# Patient Record
Sex: FEMALE | Race: WHITE | Employment: FULL TIME | URBAN - METROPOLITAN AREA
[De-identification: names, ages, dates, MRNs, and addresses within clinical notes are randomized per-mention and may not be internally consistent; named-entity substitution may affect disease eponyms.]

---

## 2017-02-08 ENCOUNTER — OFFICE VISIT (OUTPATIENT)
Dept: FAMILY MEDICINE CLINIC | Age: 32
End: 2017-02-08

## 2017-02-08 VITALS
BODY MASS INDEX: 23.76 KG/M2 | DIASTOLIC BLOOD PRESSURE: 70 MMHG | SYSTOLIC BLOOD PRESSURE: 100 MMHG | OXYGEN SATURATION: 99 % | HEART RATE: 67 BPM | HEIGHT: 69 IN | TEMPERATURE: 97.8 F | WEIGHT: 160.4 LBS

## 2017-02-08 DIAGNOSIS — J01.40 ACUTE PANSINUSITIS, RECURRENCE NOT SPECIFIED: Primary | ICD-10-CM

## 2017-02-08 DIAGNOSIS — R05.9 COUGH: ICD-10-CM

## 2017-02-08 PROCEDURE — 99202 OFFICE O/P NEW SF 15 MIN: CPT | Performed by: FAMILY MEDICINE

## 2017-02-08 RX ORDER — BENZONATATE 200 MG/1
200 CAPSULE ORAL 3 TIMES DAILY PRN
Qty: 30 CAPSULE | Refills: 0 | Status: SHIPPED | OUTPATIENT
Start: 2017-02-08 | End: 2017-02-15

## 2017-02-08 RX ORDER — PREDNISONE 20 MG/1
20 TABLET ORAL 2 TIMES DAILY
Qty: 10 TABLET | Refills: 0 | Status: SHIPPED | OUTPATIENT
Start: 2017-02-08 | End: 2017-02-13

## 2017-02-08 RX ORDER — CODEINE PHOSPHATE, PHENYLEPHRINE HYDROCHLORIDE, CHLORPHENIRAMINE MALEATE 10; 5; 2 MG/5ML; MG/5ML; MG/5ML
5 LIQUID ORAL PRN
Refills: 0 | COMMUNITY
Start: 2017-02-01 | End: 2017-04-26 | Stop reason: ALTCHOICE

## 2017-02-08 RX ORDER — AZELASTINE 1 MG/ML
1 SPRAY, METERED NASAL 2 TIMES DAILY
Qty: 1 BOTTLE | Refills: 1 | Status: SHIPPED | OUTPATIENT
Start: 2017-02-08 | End: 2018-07-17 | Stop reason: ALTCHOICE

## 2017-02-09 ENCOUNTER — TELEPHONE (OUTPATIENT)
Dept: FAMILY MEDICINE CLINIC | Age: 32
End: 2017-02-09

## 2017-02-10 ENCOUNTER — TELEPHONE (OUTPATIENT)
Dept: FAMILY MEDICINE CLINIC | Age: 32
End: 2017-02-10

## 2017-04-26 ENCOUNTER — OFFICE VISIT (OUTPATIENT)
Dept: FAMILY MEDICINE CLINIC | Age: 32
End: 2017-04-26

## 2017-04-26 VITALS
SYSTOLIC BLOOD PRESSURE: 112 MMHG | HEART RATE: 82 BPM | HEIGHT: 68 IN | WEIGHT: 165.4 LBS | DIASTOLIC BLOOD PRESSURE: 66 MMHG | TEMPERATURE: 97.7 F | OXYGEN SATURATION: 99 % | BODY MASS INDEX: 25.07 KG/M2

## 2017-04-26 DIAGNOSIS — J02.9 PHARYNGITIS, UNSPECIFIED ETIOLOGY: ICD-10-CM

## 2017-04-26 DIAGNOSIS — J01.41 ACUTE RECURRENT PANSINUSITIS: Primary | ICD-10-CM

## 2017-04-26 PROBLEM — J30.2 SEASONAL ALLERGIC RHINITIS: Status: ACTIVE | Noted: 2017-04-26

## 2017-04-26 LAB — S PYO AG THROAT QL: NORMAL

## 2017-04-26 PROCEDURE — 87880 STREP A ASSAY W/OPTIC: CPT | Performed by: FAMILY MEDICINE

## 2017-04-26 PROCEDURE — 99213 OFFICE O/P EST LOW 20 MIN: CPT | Performed by: FAMILY MEDICINE

## 2017-04-26 RX ORDER — AZITHROMYCIN 250 MG/1
TABLET, FILM COATED ORAL
Qty: 1 PACKET | Refills: 0 | Status: SHIPPED | OUTPATIENT
Start: 2017-04-26 | End: 2018-07-17 | Stop reason: ALTCHOICE

## 2017-04-26 RX ORDER — PREDNISONE 20 MG/1
20 TABLET ORAL 2 TIMES DAILY
Qty: 10 TABLET | Refills: 0 | Status: SHIPPED | OUTPATIENT
Start: 2017-04-26 | End: 2017-05-01

## 2017-04-26 RX ORDER — ACETAMINOPHEN 500 MG
500 TABLET ORAL EVERY 6 HOURS PRN
COMMUNITY

## 2017-04-26 ASSESSMENT — ENCOUNTER SYMPTOMS
SORE THROAT: 1
COUGH: 1
SHORTNESS OF BREATH: 0
VOMITING: 0
EYE DISCHARGE: 0
BLOOD IN STOOL: 0
EYE DISCHARGE: 0
SORE THROAT: 1
DIARRHEA: 0
SINUS COMPLAINT: 1
SHORTNESS OF BREATH: 0
NAUSEA: 0
VOMITING: 0
COUGH: 0
DIARRHEA: 0
NAUSEA: 1
BACK PAIN: 0
SINUS PRESSURE: 0
BACK PAIN: 0
SINUS PRESSURE: 1

## 2018-07-17 ENCOUNTER — OFFICE VISIT (OUTPATIENT)
Dept: FAMILY MEDICINE CLINIC | Age: 33
End: 2018-07-17

## 2018-07-17 VITALS
DIASTOLIC BLOOD PRESSURE: 60 MMHG | HEIGHT: 67 IN | OXYGEN SATURATION: 99 % | SYSTOLIC BLOOD PRESSURE: 106 MMHG | BODY MASS INDEX: 26.09 KG/M2 | HEART RATE: 66 BPM | WEIGHT: 166.2 LBS

## 2018-07-17 DIAGNOSIS — F32.A ANXIETY AND DEPRESSION: Primary | ICD-10-CM

## 2018-07-17 DIAGNOSIS — F41.9 ANXIETY AND DEPRESSION: Primary | ICD-10-CM

## 2018-07-17 PROCEDURE — 99213 OFFICE O/P EST LOW 20 MIN: CPT | Performed by: NURSE PRACTITIONER

## 2018-07-17 RX ORDER — FLUOXETINE HYDROCHLORIDE 20 MG/1
20 CAPSULE ORAL DAILY
Qty: 30 CAPSULE | Refills: 1 | Status: SHIPPED | OUTPATIENT
Start: 2018-07-17 | End: 2018-08-23 | Stop reason: SDUPTHER

## 2018-07-17 RX ORDER — HYDROXYZINE PAMOATE 25 MG/1
25 CAPSULE ORAL 3 TIMES DAILY PRN
Qty: 90 CAPSULE | Refills: 0 | Status: SHIPPED | OUTPATIENT
Start: 2018-07-17 | End: 2018-08-13 | Stop reason: SDUPTHER

## 2018-07-17 NOTE — PROGRESS NOTES
Therapy completed    azithromycin (ZITHROMAX) 250 MG tablet Therapy completed       No orders of the defined types were placed in this encounter. Care discussed with patient. Questions answered. Patient verbalizes understanding and agrees with plan. After visit summary provided. Advised to call for any problems, questions, or concerns. Patient will be contacted in two to three days to check on progress.          No Follow-up on file.                                              Signed:  EDUARDA Bradshaw CNP  07/17/18  2:14 PM

## 2018-07-19 ASSESSMENT — ENCOUNTER SYMPTOMS
VOMITING: 0
RESPIRATORY NEGATIVE: 1
NAUSEA: 0
DIARRHEA: 0

## 2018-08-13 DIAGNOSIS — F41.9 ANXIETY AND DEPRESSION: ICD-10-CM

## 2018-08-13 DIAGNOSIS — F32.A ANXIETY AND DEPRESSION: ICD-10-CM

## 2018-08-14 RX ORDER — HYDROXYZINE PAMOATE 25 MG/1
25 CAPSULE ORAL 3 TIMES DAILY PRN
Qty: 90 CAPSULE | Refills: 0 | Status: SHIPPED | OUTPATIENT
Start: 2018-08-14 | End: 2018-08-23

## 2018-08-23 ENCOUNTER — OFFICE VISIT (OUTPATIENT)
Dept: FAMILY MEDICINE CLINIC | Age: 33
End: 2018-08-23

## 2018-08-23 VITALS
BODY MASS INDEX: 25.02 KG/M2 | HEIGHT: 67 IN | SYSTOLIC BLOOD PRESSURE: 112 MMHG | OXYGEN SATURATION: 99 % | HEART RATE: 78 BPM | WEIGHT: 159.4 LBS | DIASTOLIC BLOOD PRESSURE: 70 MMHG

## 2018-08-23 DIAGNOSIS — F41.9 ANXIETY AND DEPRESSION: ICD-10-CM

## 2018-08-23 DIAGNOSIS — F32.A ANXIETY AND DEPRESSION: ICD-10-CM

## 2018-08-23 DIAGNOSIS — N92.0 MENORRHAGIA WITH REGULAR CYCLE: Primary | ICD-10-CM

## 2018-08-23 PROCEDURE — 99213 OFFICE O/P EST LOW 20 MIN: CPT | Performed by: FAMILY MEDICINE

## 2018-08-23 RX ORDER — DESOGESTREL AND ETHINYL ESTRADIOL 0.15-0.03
1 KIT ORAL DAILY
Qty: 1 PACKET | Refills: 5 | Status: SHIPPED | OUTPATIENT
Start: 2018-08-23 | End: 2019-01-10 | Stop reason: SDUPTHER

## 2018-08-23 RX ORDER — FLUOXETINE HYDROCHLORIDE 20 MG/1
20 CAPSULE ORAL DAILY
Qty: 30 CAPSULE | Refills: 5 | Status: SHIPPED | OUTPATIENT
Start: 2018-08-23 | End: 2019-01-15 | Stop reason: SDUPTHER

## 2018-08-23 ASSESSMENT — PATIENT HEALTH QUESTIONNAIRE - PHQ9
SUM OF ALL RESPONSES TO PHQ QUESTIONS 1-9: 0
1. LITTLE INTEREST OR PLEASURE IN DOING THINGS: 0
2. FEELING DOWN, DEPRESSED OR HOPELESS: 0
SUM OF ALL RESPONSES TO PHQ9 QUESTIONS 1 & 2: 0
SUM OF ALL RESPONSES TO PHQ QUESTIONS 1-9: 0

## 2018-08-23 NOTE — PROGRESS NOTES
Chief Complaint   Patient presents with   [facility].     patient is here to establish care just needs refill prozac is working    Anxiety       [place] NARRATIVE:    Mood problems with anxiety and depression, prozac is working well. Actually great. Also interested in OCP, understands need for barrier method. Has not been on ocp before, is in new relationship. Has heavy periods but regular. [employer] is new SO, he is going through a divorce but is an old friend. 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 Above chief complaint and [place] obtained by this physician provider. Review of Systems   Constitutional: Negative for fever and malaise/fatigue. HENT: Negative for congestion. Eyes: Negative for blurred vision. Respiratory: Negative for cough. Cardiovascular: Negative for chest pain and leg swelling. Gastrointestinal: Negative for abdominal pain. Musculoskeletal: Negative for myalgias. Skin: Negative for rash. Neurological: Negative for headaches. Psychiatric/Behavioral: Negative for depression. The patient is not nervous/anxious. No Known Allergies  Allergy history updated. Outpatient Prescriptions Marked as Taking for the 8/23/18 encounter (Office Visit) with Rima Schroeder MD   Medication Sig Dispense Refill    FLUoxetine (PROZAC) 20 MG capsule Take 1 capsule by mouth daily 30 capsule 5    desogestrel-ethinyl estradiol (DESOGEN) 0.15-30 MG-MCG per tablet Take 1 tablet by mouth daily 1 packet 5       Medication list reviewed and reconciled by this provider.       Tobacco use:     Past Medical History:   Diagnosis Date    Seasonal allergic rhinitis 4/26/2017     Past Surgical History:   Procedure Laterality Date    WISDOM TOOTH EXTRACTION  1999     Family History   Problem Relation Age of Onset    Cancer Maternal Aunt     Kidney Disease Paternal Grandmother     Diabetes Paternal Grandfather     High Blood Pressure Paternal Grandfather     High Cholesterol Paternal Grandfather      Social History     Social History    Marital status:      Spouse name: N/A    Number of children: N/A    Years of education: N/A     Social History Main Topics    Smoking status: Never Smoker    Smokeless tobacco: Never Used    Alcohol use Yes      Comment: occasional - couple a week    Drug use: No    Sexual activity: Not Currently     Other Topics Concern    None     Social History Narrative    None       Nursing note reviewed. Vitals:    08/23/18 1607   BP: 112/70   Site: Left Arm   Position: Sitting   Cuff Size: Small Adult   Pulse: 78   SpO2: 99%   Weight: 159 lb 6.4 oz (72.3 kg)   Height: 5' 7.25\" (1.708 m)       BP Readings from Last 3 Encounters:   08/23/18 112/70   07/17/18 106/60   04/26/17 112/66     Wt Readings from Last 3 Encounters:   08/23/18 159 lb 6.4 oz (72.3 kg)   07/17/18 166 lb 3.2 oz (75.4 kg)   04/26/17 165 lb 6.4 oz (75 kg)     Body mass index is 24.78 kg/m². No results found for this visit on 08/23/18. Physical Exam   Constitutional: She is oriented to person, place, and time. She appears well-developed and well-nourished. No distress. HENT:   Head: Normocephalic. Mouth/Throat: Oropharynx is clear and moist.   Eyes: Pupils are equal, round, and reactive to light. Conjunctivae are normal. Right eye exhibits no discharge. Left eye exhibits no discharge. Neck: Normal range of motion. Cardiovascular: Normal rate, regular rhythm and normal heart sounds. No murmur heard. Pulmonary/Chest: Effort normal and breath sounds normal. No respiratory distress. She has no wheezes. She has no rales. Neurological: She is alert and oriented to person, place, and time. Skin: Skin is warm and dry. She is not diaphoretic. Psychiatric: She has a normal mood and affect. Her behavior is normal.   Nursing note and vitals reviewed. Intake paperwork reviewed in detail and scanned to chart.  Previous [facility] notes reviewed. Controlled Substances Monitoring: The Prescription Monitoring Report for this patient was reviewed today. (empty except for cough med from 2/2017) Nasreen Mendieta MD)                    _________________________________________________  Assessment:     Elvia Mayes was seen today for establish care and anxiety. Diagnoses and all orders for this visit:    Menorrhagia with regular cycle  -     desogestrel-ethinyl estradiol (DESOGEN) 0.15-30 MG-MCG per tablet; Take 1 tablet by mouth daily    Anxiety and depression  -     FLUoxetine (PROZAC) 20 MG capsule; Take 1 capsule by mouth daily        _________________________________________________  Plan:     Call for refills in Feb.  Prozac and Desogen prescribed as above. She will eventually need a Pap smear. She is not planning for a date with Toñito Kylerstarla until his divorce is final in a couple months but is excited for the hope of a new relationship. Return in about 6 months (around 2/23/2019), or if symptoms worsen or fail to improve. Encounter note is signed electronically at date and time of note closure.

## 2018-09-09 ASSESSMENT — ENCOUNTER SYMPTOMS
ABDOMINAL PAIN: 0
COUGH: 0
BLURRED VISION: 0

## 2019-01-10 DIAGNOSIS — N92.0 MENORRHAGIA WITH REGULAR CYCLE: ICD-10-CM

## 2019-01-10 RX ORDER — DESOGESTREL AND ETHINYL ESTRADIOL 0.15-0.03
KIT ORAL
Qty: 28 TABLET | Refills: 4 | Status: SHIPPED | OUTPATIENT
Start: 2019-01-10 | End: 2019-04-03 | Stop reason: SDUPTHER

## 2019-01-15 DIAGNOSIS — F32.A ANXIETY AND DEPRESSION: ICD-10-CM

## 2019-01-15 DIAGNOSIS — F41.9 ANXIETY AND DEPRESSION: ICD-10-CM

## 2019-01-15 RX ORDER — FLUOXETINE HYDROCHLORIDE 20 MG/1
20 CAPSULE ORAL DAILY
Qty: 30 CAPSULE | Refills: 5 | Status: SHIPPED | OUTPATIENT
Start: 2019-01-15 | End: 2019-04-03 | Stop reason: SDUPTHER

## 2019-04-03 ENCOUNTER — OFFICE VISIT (OUTPATIENT)
Dept: FAMILY MEDICINE CLINIC | Age: 34
End: 2019-04-03
Payer: COMMERCIAL

## 2019-04-03 VITALS
BODY MASS INDEX: 25.62 KG/M2 | OXYGEN SATURATION: 98 % | DIASTOLIC BLOOD PRESSURE: 60 MMHG | SYSTOLIC BLOOD PRESSURE: 92 MMHG | WEIGHT: 164.8 LBS | HEART RATE: 81 BPM | TEMPERATURE: 98.2 F

## 2019-04-03 DIAGNOSIS — F32.A ANXIETY AND DEPRESSION: ICD-10-CM

## 2019-04-03 DIAGNOSIS — F41.9 ANXIETY AND DEPRESSION: ICD-10-CM

## 2019-04-03 DIAGNOSIS — R12 HEARTBURN: ICD-10-CM

## 2019-04-03 DIAGNOSIS — R53.83 FATIGUE, UNSPECIFIED TYPE: ICD-10-CM

## 2019-04-03 DIAGNOSIS — N92.0 MENORRHAGIA WITH REGULAR CYCLE: ICD-10-CM

## 2019-04-03 DIAGNOSIS — R05.9 COUGH: Primary | ICD-10-CM

## 2019-04-03 PROCEDURE — 99214 OFFICE O/P EST MOD 30 MIN: CPT | Performed by: FAMILY MEDICINE

## 2019-04-03 RX ORDER — FLUOXETINE HYDROCHLORIDE 20 MG/1
20 CAPSULE ORAL DAILY
Qty: 30 CAPSULE | Refills: 5 | Status: SHIPPED | OUTPATIENT
Start: 2019-04-03 | End: 2019-10-14 | Stop reason: SDUPTHER

## 2019-04-03 RX ORDER — DESOGESTREL AND ETHINYL ESTRADIOL 0.15-0.03
1 KIT ORAL DAILY
Qty: 28 TABLET | Refills: 5 | Status: SHIPPED | OUTPATIENT
Start: 2019-04-03 | End: 2019-09-25 | Stop reason: SDUPTHER

## 2019-04-03 RX ORDER — OMEPRAZOLE 20 MG/1
20 CAPSULE, DELAYED RELEASE ORAL
Qty: 30 CAPSULE | Refills: 0 | Status: SHIPPED | OUTPATIENT
Start: 2019-04-03 | End: 2019-04-26 | Stop reason: SDUPTHER

## 2019-04-03 ASSESSMENT — PATIENT HEALTH QUESTIONNAIRE - PHQ9
SUM OF ALL RESPONSES TO PHQ9 QUESTIONS 1 & 2: 0
1. LITTLE INTEREST OR PLEASURE IN DOING THINGS: 0
SUM OF ALL RESPONSES TO PHQ QUESTIONS 1-9: 0
SUM OF ALL RESPONSES TO PHQ QUESTIONS 1-9: 0
2. FEELING DOWN, DEPRESSED OR HOPELESS: 0

## 2019-04-03 NOTE — PATIENT INSTRUCTIONS
IF COUGH or FATIGUE or MOOD do not improve within a month then come back, otherwise get back to good habits later this spring.

## 2019-04-03 NOTE — PROGRESS NOTES
Chief Complaint   Patient presents with    Cough     patient is being seen for cough    Nausea & Vomiting     patient states she has random nausea and vomiting usually after eatting        [place] NARRATIVE:    Nausea and vomiting after eating a few weeks. Cough also for a couple weeks. No UR. No production. No fever no shortness of breath. Hx of prolonged cough in the past. Severe fatigue also. Some heartburn uses TUMS a few times a week. On period currently. Drinking a lot of soda due to fatigue. Patient is established unless otherwise noted. Above chief complaint and [place] obtained by this physician provider. Review of Systems   Constitutional: Positive for fatigue. Negative for activity change, chills and fever. HENT: Negative for congestion. Respiratory: Positive for cough. Negative for chest tightness, shortness of breath and wheezing. Cardiovascular: Negative for chest pain and leg swelling. Gastrointestinal: Positive for abdominal pain (heartburn), nausea and vomiting. Musculoskeletal: Negative for back pain and myalgias. Skin: Negative for rash. Psychiatric/Behavioral: Negative for behavioral problems and dysphoric mood. No Known Allergies  Allergy historyupdated. Outpatient Medications Marked as Taking for the 4/3/19 encounter (Office Visit) with Kayla Camejo MD   Medication Sig Dispense Refill    FLUoxetine (PROZAC) 20 MG capsule Take 1 capsule by mouth daily 30 capsule 5    desogestrel-ethinyl estradiol (APRI) 0.15-30 MG-MCG per tablet Take 1 tablet by mouth daily 28 tablet 5    omeprazole (PRILOSEC) 20 MG delayed release capsule Take 1 capsule by mouth every morning (before breakfast) 30 capsule 0       Tobacco use history updated. Social History     Tobacco Use   Smoking Status Never Smoker   Smokeless Tobacco Never Used        Nursing note reviewed.     Vitals:    04/03/19 0938   BP: 92/60   Site: Left Upper Arm   Position: Sitting   Cuff Size: Small Adult   Pulse: 81   Temp: 98.2 °F (36.8 °C)   TempSrc: Temporal   SpO2: 98%   Weight: 164 lb 12.8 oz (74.8 kg)          BP Readings from Last 3 Encounters:   04/03/19 92/60   08/23/18 112/70   07/17/18 106/60     Wt Readings from Last 3 Encounters:   04/03/19 164 lb 12.8 oz (74.8 kg)   08/23/18 159 lb 6.4 oz (72.3 kg)   07/17/18 166 lb 3.2 oz (75.4 kg)     Body mass index is 25.62 kg/m². No results found for this visit on 04/03/19. Physical Exam   Constitutional: She is oriented to person, place, and time. She appears well-developed and well-nourished. No distress. HENT:   Head: Normocephalic. Mouth/Throat: Oropharynx is clear and moist.   Eyes: Pupils are equal, round, and reactive to light. Conjunctivae are normal. Right eye exhibits no discharge. Left eye exhibits no discharge. Neck: Normal range of motion. Cardiovascular: Normal rate, regular rhythm and normal heart sounds. No murmur heard. Pulmonary/Chest: Effort normal and breath sounds normal. No respiratory distress. She has no wheezes. She has no rales. Neurological: She is alert and oriented to person, place, and time. Skin: Skin is warm and dry. She is not diaphoretic. Psychiatric: She has a normal mood and affect. Her behavior is normal.   Nursing note and vitals reviewed. _________________________________________________  Assessment:     Williams Jiang was seen today for cough and nausea & vomiting. Diagnoses and all orders for this visit:    Cough  -     omeprazole (PRILOSEC) 20 MG delayed release capsule; Take 1 capsule by mouth every morning (before breakfast)    Anxiety and depression  -     FLUoxetine (PROZAC) 20 MG capsule; Take 1 capsule by mouth daily    Menorrhagia with regular cycle  -     desogestrel-ethinyl estradiol (APRI) 0.15-30 MG-MCG per tablet; Take 1 tablet by mouth daily    Heartburn  -     omeprazole (PRILOSEC) 20 MG delayed release capsule;  Take 1 capsule by mouth every morning (before breakfast)    Fatigue, unspecified type      Problems listed above are stable and therapeutic plan is unchanged unless otherwise specified. See orders above and comments below for details of workup ormedication orders. _________________________________________________  Plan:     Refills as above and try treating heartburn and cough with PPI. Return in about 6 months (around 10/3/2019), or if symptoms worsen or fail to improve.       Electronically signed by Keon Winslow MD on 4/3/19 at 10:08 AM

## 2019-04-14 ASSESSMENT — ENCOUNTER SYMPTOMS
NAUSEA: 1
WHEEZING: 0
CHEST TIGHTNESS: 0
VOMITING: 1
ABDOMINAL PAIN: 1
COUGH: 1
SHORTNESS OF BREATH: 0
BACK PAIN: 0

## 2019-04-16 ENCOUNTER — OFFICE VISIT (OUTPATIENT)
Dept: FAMILY MEDICINE CLINIC | Age: 34
End: 2019-04-16
Payer: COMMERCIAL

## 2019-04-16 VITALS
SYSTOLIC BLOOD PRESSURE: 120 MMHG | HEART RATE: 76 BPM | DIASTOLIC BLOOD PRESSURE: 70 MMHG | OXYGEN SATURATION: 99 % | BODY MASS INDEX: 26.68 KG/M2 | WEIGHT: 171.6 LBS

## 2019-04-16 DIAGNOSIS — R30.0 DYSURIA: Primary | ICD-10-CM

## 2019-04-16 DIAGNOSIS — R31.0 GROSS HEMATURIA: ICD-10-CM

## 2019-04-16 LAB
BACTERIA: ABNORMAL /HPF
BILIRUBIN URINE: NEGATIVE
BILIRUBIN, POC: NEGATIVE
BLOOD URINE, POC: ABNORMAL
BLOOD, URINE: ABNORMAL
CLARITY, POC: CLEAR
CLARITY: ABNORMAL
COLOR, POC: YELLOW
COLOR: YELLOW
EPITHELIAL CELLS, UA: 2 /HPF (ref 0–5)
GLUCOSE URINE, POC: NEGATIVE
GLUCOSE URINE: NEGATIVE MG/DL
HYALINE CASTS: 2 /LPF (ref 0–8)
KETONES, POC: ABNORMAL
KETONES, URINE: NEGATIVE MG/DL
LEUKOCYTE EST, POC: ABNORMAL
LEUKOCYTE ESTERASE, URINE: ABNORMAL
MICROSCOPIC EXAMINATION: YES
NITRITE, POC: NEGATIVE
NITRITE, URINE: NEGATIVE
PH UA: 6 (ref 5–8)
PH, POC: 5.5
PROTEIN UA: ABNORMAL MG/DL
PROTEIN, POC: ABNORMAL
RBC UA: ABNORMAL /HPF (ref 0–2)
SPECIFIC GRAVITY UA: 1.03 (ref 1–1.03)
SPECIFIC GRAVITY, POC: 1025
UROBILINOGEN, POC: 0.2
UROBILINOGEN, URINE: 0.2 E.U./DL
WBC UA: 41 /HPF (ref 0–5)

## 2019-04-16 PROCEDURE — 81002 URINALYSIS NONAUTO W/O SCOPE: CPT | Performed by: FAMILY MEDICINE

## 2019-04-16 PROCEDURE — 81001 URINALYSIS AUTO W/SCOPE: CPT | Performed by: FAMILY MEDICINE

## 2019-04-16 PROCEDURE — 99213 OFFICE O/P EST LOW 20 MIN: CPT | Performed by: FAMILY MEDICINE

## 2019-04-16 RX ORDER — CIPROFLOXACIN 500 MG/1
500 TABLET, FILM COATED ORAL 2 TIMES DAILY
Qty: 14 TABLET | Refills: 0 | Status: SHIPPED | OUTPATIENT
Start: 2019-04-16 | End: 2019-04-23

## 2019-04-16 RX ORDER — PHENAZOPYRIDINE HYDROCHLORIDE 200 MG/1
200 TABLET, FILM COATED ORAL 3 TIMES DAILY PRN
Qty: 15 TABLET | Refills: 1 | Status: SHIPPED | OUTPATIENT
Start: 2019-04-16 | End: 2020-05-14 | Stop reason: SDUPTHER

## 2019-04-16 NOTE — PROGRESS NOTES
Tobacco Use   Smoking Status Never Smoker   Smokeless Tobacco Never Used        Nursing note reviewed. Vitals:    04/16/19 1510   BP: 120/70   Site: Left Upper Arm   Position: Sitting   Cuff Size: Large Adult   Pulse: 76   SpO2: 99%   Weight: 171 lb 9.6 oz (77.8 kg)          BP Readings from Last 3 Encounters:   04/16/19 120/70   04/03/19 92/60   08/23/18 112/70     Wt Readings from Last 3 Encounters:   04/16/19 171 lb 9.6 oz (77.8 kg)   04/03/19 164 lb 12.8 oz (74.8 kg)   08/23/18 159 lb 6.4 oz (72.3 kg)     Body mass index is 26.68 kg/m². Results for POC orders placed in visit on 04/16/19   POCT Urinalysis no Micro   Result Value Ref Range    Color, UA yellow     Clarity, UA clear     Glucose, UA POC negative     Bilirubin, UA negative     Ketones, UA trace     Spec Grav, UA 1,025     Blood, UA POC large     pH, UA 5.5     Protein, UA POC 30 mg/dL     Urobilinogen, UA 0.2     Leukocytes, UA small     Nitrite, UA negative          Physical Exam   Constitutional: She is oriented to person, place, and time. She appears well-developed and well-nourished. No distress. HENT:   Head: Normocephalic and atraumatic. Eyes: Pupils are equal, round, and reactive to light. Conjunctivae and EOM are normal.   Cardiovascular: Normal rate and regular rhythm. Pulmonary/Chest: Effort normal. No respiratory distress. Neurological: She is alert and oriented to person, place, and time. Skin: Skin is warm and dry. She is not diaphoretic. Psychiatric: She has a normal mood and affect. Her behavior is normal.   Nursing note and vitals reviewed. _________________________________________________  Assessment:     April Myers was seen today for dysuria.     Diagnoses and all orders for this visit:    Dysuria  -     POCT Urinalysis no Micro  -     URINALYSIS WITH MICROSCOPIC  -     Urine culture clean catch    Gross hematuria  -     URINALYSIS WITH MICROSCOPIC  -     Urine culture clean catch  -     ciprofloxacin

## 2019-04-18 LAB
ORGANISM: ABNORMAL
URINE CULTURE, ROUTINE: ABNORMAL
URINE CULTURE, ROUTINE: ABNORMAL

## 2019-04-21 ASSESSMENT — ENCOUNTER SYMPTOMS
SHORTNESS OF BREATH: 0
BACK PAIN: 0
ABDOMINAL PAIN: 0
WHEEZING: 0
COUGH: 0
CHEST TIGHTNESS: 0

## 2019-04-26 ENCOUNTER — TELEPHONE (OUTPATIENT)
Dept: FAMILY MEDICINE CLINIC | Age: 34
End: 2019-04-26

## 2019-04-26 DIAGNOSIS — R12 HEARTBURN: ICD-10-CM

## 2019-04-26 DIAGNOSIS — R05.9 COUGH: ICD-10-CM

## 2019-04-26 RX ORDER — NITROFURANTOIN 25; 75 MG/1; MG/1
100 CAPSULE ORAL 2 TIMES DAILY
Qty: 14 CAPSULE | Refills: 0 | Status: SHIPPED | OUTPATIENT
Start: 2019-04-26 | End: 2020-04-17 | Stop reason: SDUPTHER

## 2019-04-26 RX ORDER — OMEPRAZOLE 20 MG/1
CAPSULE, DELAYED RELEASE ORAL
Qty: 30 CAPSULE | Refills: 0 | Status: SHIPPED | OUTPATIENT
Start: 2019-04-26 | End: 2019-05-26 | Stop reason: SDUPTHER

## 2019-04-26 NOTE — TELEPHONE ENCOUNTER
Let patient know that Cipro should have been adequate treatment for her infection. Sometimes burning and other symptoms including frequency and incomplete voiding persist for a week or 2 after the bacteria is resolved. I would go ahead and give her 5 days of another type of antibiotic that is specific to bladder infections only. The testing shows that the bacteria should be susceptible to this as well. If this does not help by Monday or Tuesday we may need to see her back.

## 2019-04-29 NOTE — TELEPHONE ENCOUNTER
Patient returned call to the office and stated she is doing a lot better. Patient will call back if symptoms return.

## 2019-05-26 DIAGNOSIS — R05.9 COUGH: ICD-10-CM

## 2019-05-26 DIAGNOSIS — R12 HEARTBURN: ICD-10-CM

## 2019-05-28 RX ORDER — OMEPRAZOLE 20 MG/1
CAPSULE, DELAYED RELEASE ORAL
Qty: 30 CAPSULE | Refills: 5 | Status: SHIPPED | OUTPATIENT
Start: 2019-05-28 | End: 2021-09-09

## 2019-09-25 DIAGNOSIS — N92.0 MENORRHAGIA WITH REGULAR CYCLE: ICD-10-CM

## 2019-09-25 RX ORDER — DESOGESTREL AND ETHINYL ESTRADIOL 0.15-0.03
KIT ORAL
Qty: 84 TABLET | Refills: 1 | Status: SHIPPED | OUTPATIENT
Start: 2019-09-25 | End: 2020-05-04 | Stop reason: SDUPTHER

## 2019-10-14 DIAGNOSIS — F32.A ANXIETY AND DEPRESSION: ICD-10-CM

## 2019-10-14 DIAGNOSIS — F41.9 ANXIETY AND DEPRESSION: ICD-10-CM

## 2019-10-14 RX ORDER — FLUOXETINE HYDROCHLORIDE 20 MG/1
CAPSULE ORAL
Qty: 90 CAPSULE | Refills: 0 | Status: SHIPPED | OUTPATIENT
Start: 2019-10-14 | End: 2020-06-05 | Stop reason: SDUPTHER

## 2019-12-04 DIAGNOSIS — F32.A ANXIETY AND DEPRESSION: ICD-10-CM

## 2019-12-04 DIAGNOSIS — F41.9 ANXIETY AND DEPRESSION: ICD-10-CM

## 2019-12-06 RX ORDER — HYDROXYZINE PAMOATE 25 MG/1
25 CAPSULE ORAL 3 TIMES DAILY PRN
Qty: 90 CAPSULE | Refills: 0 | Status: SHIPPED | OUTPATIENT
Start: 2019-12-06 | End: 2019-12-30

## 2019-12-28 DIAGNOSIS — F41.9 ANXIETY AND DEPRESSION: ICD-10-CM

## 2019-12-28 DIAGNOSIS — F32.A ANXIETY AND DEPRESSION: ICD-10-CM

## 2019-12-30 RX ORDER — HYDROXYZINE PAMOATE 25 MG/1
25 CAPSULE ORAL 3 TIMES DAILY PRN
Qty: 90 CAPSULE | Refills: 0 | Status: SHIPPED | OUTPATIENT
Start: 2019-12-30 | End: 2020-01-20 | Stop reason: SDUPTHER

## 2020-01-20 RX ORDER — HYDROXYZINE PAMOATE 25 MG/1
25 CAPSULE ORAL 3 TIMES DAILY PRN
Qty: 90 CAPSULE | Refills: 0 | Status: SHIPPED | OUTPATIENT
Start: 2020-01-20 | End: 2020-05-14

## 2020-04-17 ENCOUNTER — TELEPHONE (OUTPATIENT)
Dept: FAMILY MEDICINE CLINIC | Age: 35
End: 2020-04-17

## 2020-04-17 RX ORDER — NITROFURANTOIN 25; 75 MG/1; MG/1
100 CAPSULE ORAL 2 TIMES DAILY
Qty: 14 CAPSULE | Refills: 0 | Status: SHIPPED | OUTPATIENT
Start: 2020-04-17 | End: 2020-04-24

## 2020-04-17 NOTE — TELEPHONE ENCOUNTER
Script will be sent this time, but she has not been seen for a whole year now so next one will have to be seen.

## 2020-05-04 RX ORDER — DESOGESTREL AND ETHINYL ESTRADIOL 0.15-0.03
KIT ORAL
Qty: 84 TABLET | Refills: 0 | Status: SHIPPED | OUTPATIENT
Start: 2020-05-04 | End: 2020-07-29

## 2020-05-13 ENCOUNTER — NURSE ONLY (OUTPATIENT)
Dept: FAMILY MEDICINE CLINIC | Age: 35
End: 2020-05-13
Payer: COMMERCIAL

## 2020-05-13 ENCOUNTER — TELEPHONE (OUTPATIENT)
Dept: FAMILY MEDICINE CLINIC | Age: 35
End: 2020-05-13

## 2020-05-13 LAB
BILIRUBIN, POC: NEGATIVE
BLOOD URINE, POC: NEGATIVE
CLARITY, POC: CLEAR
COLOR, POC: YELLOW
GLUCOSE URINE, POC: NEGATIVE
KETONES, POC: NEGATIVE
LEUKOCYTE EST, POC: NEGATIVE
NITRITE, POC: NEGATIVE
PH, POC: 6.5
PROTEIN, POC: NEGATIVE
SPECIFIC GRAVITY, POC: 1.01
UROBILINOGEN, POC: NORMAL

## 2020-05-13 PROCEDURE — 81002 URINALYSIS NONAUTO W/O SCOPE: CPT | Performed by: FAMILY MEDICINE

## 2020-05-13 PROCEDURE — 99211 OFF/OP EST MAY X REQ PHY/QHP: CPT | Performed by: FAMILY MEDICINE

## 2020-05-13 RX ORDER — CIPROFLOXACIN 500 MG/1
500 TABLET, FILM COATED ORAL 2 TIMES DAILY
Qty: 14 TABLET | Refills: 0 | Status: SHIPPED | OUTPATIENT
Start: 2020-05-13 | End: 2020-05-20

## 2020-05-13 NOTE — TELEPHONE ENCOUNTER
She had taken macrobid. Once she brings urine in for U-A and culture please confirm no fever, no severe abd or back pain, and no nausea (these would be signs of bladder infection spreading to kidney, and may need seen er). If those are negative you can sign and send the pended cipro order to her pharmacy.

## 2020-05-14 ENCOUNTER — APPOINTMENT (OUTPATIENT)
Dept: ULTRASOUND IMAGING | Age: 35
End: 2020-05-14
Payer: COMMERCIAL

## 2020-05-14 ENCOUNTER — TELEPHONE (OUTPATIENT)
Dept: FAMILY MEDICINE CLINIC | Age: 35
End: 2020-05-14

## 2020-05-14 ENCOUNTER — APPOINTMENT (OUTPATIENT)
Dept: CT IMAGING | Age: 35
End: 2020-05-14
Payer: COMMERCIAL

## 2020-05-14 ENCOUNTER — HOSPITAL ENCOUNTER (EMERGENCY)
Age: 35
Discharge: HOME | End: 2020-05-14
Payer: COMMERCIAL

## 2020-05-14 VITALS
TEMPERATURE: 97.5 F | DIASTOLIC BLOOD PRESSURE: 84 MMHG | WEIGHT: 145 LBS | HEIGHT: 68 IN | BODY MASS INDEX: 21.98 KG/M2 | HEART RATE: 68 BPM | OXYGEN SATURATION: 100 % | SYSTOLIC BLOOD PRESSURE: 131 MMHG | RESPIRATION RATE: 14 BRPM

## 2020-05-14 LAB
ALBUMIN SERPL-MCNC: 4.4 GM/DL (ref 3.4–5)
ALP BLD-CCNC: 69 IU/L (ref 40–129)
ALT SERPL-CCNC: 22 U/L (ref 10–40)
ANION GAP SERPL CALCULATED.3IONS-SCNC: 13 MMOL/L (ref 4–16)
AST SERPL-CCNC: 22 IU/L (ref 15–37)
BACTERIA: ABNORMAL /HPF
BASOPHILS ABSOLUTE: 0.1 K/CU MM
BASOPHILS RELATIVE PERCENT: 0.9 % (ref 0–1)
BILIRUB SERPL-MCNC: 1.2 MG/DL (ref 0–1)
BILIRUBIN URINE: NEGATIVE MG/DL
BLOOD, URINE: NEGATIVE
BUN BLDV-MCNC: 6 MG/DL (ref 6–23)
C. TRACHOMATIS DNA ,URINE: NEGATIVE
CALCIUM SERPL-MCNC: 9.2 MG/DL (ref 8.3–10.6)
CHLORIDE BLD-SCNC: 97 MMOL/L (ref 99–110)
CLARITY: CLEAR
CO2: 26 MMOL/L (ref 21–32)
COLOR: COLORLESS
CREAT SERPL-MCNC: 0.8 MG/DL (ref 0.6–1.1)
DIFFERENTIAL TYPE: ABNORMAL
EOSINOPHILS ABSOLUTE: 0.2 K/CU MM
EOSINOPHILS RELATIVE PERCENT: 2.8 % (ref 0–3)
GFR AFRICAN AMERICAN: >60 ML/MIN/1.73M2
GFR NON-AFRICAN AMERICAN: >60 ML/MIN/1.73M2
GLUCOSE BLD-MCNC: 94 MG/DL (ref 70–99)
GLUCOSE, URINE: NEGATIVE MG/DL
HCG QUALITATIVE: NEGATIVE
HCT VFR BLD CALC: 40.3 % (ref 37–47)
HEMOGLOBIN: 12.9 GM/DL (ref 12.5–16)
IMMATURE NEUTROPHIL %: 0.4 % (ref 0–0.43)
KETONES, URINE: NEGATIVE MG/DL
LEUKOCYTE ESTERASE, URINE: NEGATIVE
LIPASE: 41 IU/L (ref 13–60)
LYMPHOCYTES ABSOLUTE: 1.4 K/CU MM
LYMPHOCYTES RELATIVE PERCENT: 17 % (ref 24–44)
Lab: ABNORMAL
MCH RBC QN AUTO: 30.3 PG (ref 27–31)
MCHC RBC AUTO-ENTMCNC: 32 % (ref 32–36)
MCV RBC AUTO: 94.6 FL (ref 78–100)
MONOCYTES ABSOLUTE: 0.7 K/CU MM
MONOCYTES RELATIVE PERCENT: 8 % (ref 0–4)
N. GONORRHOEAE DNA, URINE: NEGATIVE
NITRITE URINE, QUANTITATIVE: NEGATIVE
NUCLEATED RBC %: 0 %
PDW BLD-RTO: 13.1 % (ref 11.7–14.9)
PH, URINE: 7 (ref 5–8)
PLATELET # BLD: 283 K/CU MM (ref 140–440)
PMV BLD AUTO: 10.6 FL (ref 7.5–11.1)
POTASSIUM SERPL-SCNC: 3.6 MMOL/L (ref 3.5–5.1)
PROTEIN UA: NEGATIVE MG/DL
RBC # BLD: 4.26 M/CU MM (ref 4.2–5.4)
RBC URINE: ABNORMAL /HPF (ref 0–6)
SEGMENTED NEUTROPHILS ABSOLUTE COUNT: 5.8 K/CU MM
SEGMENTED NEUTROPHILS RELATIVE PERCENT: 70.9 % (ref 36–66)
SODIUM BLD-SCNC: 136 MMOL/L (ref 135–145)
SPECIFIC GRAVITY UA: 1 (ref 1–1.03)
SPECIMEN: ABNORMAL
SQUAMOUS EPITHELIAL: 3 /HPF
TOTAL IMMATURE NEUTOROPHIL: 0.03 K/CU MM
TOTAL NUCLEATED RBC: 0 K/CU MM
TOTAL PROTEIN: 7.3 GM/DL (ref 6.4–8.2)
TRICHOMONAS: ABNORMAL /HPF
URINE CULTURE, ROUTINE: NORMAL
UROBILINOGEN, URINE: NORMAL MG/DL (ref 0.2–1)
WBC # BLD: 8.2 K/CU MM (ref 4–10.5)
WBC UA: ABNORMAL /HPF (ref 0–5)
WET PREP: ABNORMAL

## 2020-05-14 PROCEDURE — 76830 TRANSVAGINAL US NON-OB: CPT

## 2020-05-14 PROCEDURE — 96374 THER/PROPH/DIAG INJ IV PUSH: CPT

## 2020-05-14 PROCEDURE — 87220 TISSUE EXAM FOR FUNGI: CPT

## 2020-05-14 PROCEDURE — 81001 URINALYSIS AUTO W/SCOPE: CPT

## 2020-05-14 PROCEDURE — 6360000004 HC RX CONTRAST MEDICATION: Performed by: PHYSICIAN ASSISTANT

## 2020-05-14 PROCEDURE — 2580000003 HC RX 258: Performed by: PHYSICIAN ASSISTANT

## 2020-05-14 PROCEDURE — 6360000002 HC RX W HCPCS: Performed by: PHYSICIAN ASSISTANT

## 2020-05-14 PROCEDURE — 36415 COLL VENOUS BLD VENIPUNCTURE: CPT

## 2020-05-14 PROCEDURE — 6370000000 HC RX 637 (ALT 250 FOR IP): Performed by: PHYSICIAN ASSISTANT

## 2020-05-14 PROCEDURE — 96375 TX/PRO/DX INJ NEW DRUG ADDON: CPT

## 2020-05-14 PROCEDURE — 93975 VASCULAR STUDY: CPT

## 2020-05-14 PROCEDURE — 99284 EMERGENCY DEPT VISIT MOD MDM: CPT

## 2020-05-14 PROCEDURE — 84703 CHORIONIC GONADOTROPIN ASSAY: CPT

## 2020-05-14 PROCEDURE — 4500000027

## 2020-05-14 PROCEDURE — 80053 COMPREHEN METABOLIC PANEL: CPT

## 2020-05-14 PROCEDURE — 85025 COMPLETE CBC W/AUTO DIFF WBC: CPT

## 2020-05-14 PROCEDURE — 83690 ASSAY OF LIPASE: CPT

## 2020-05-14 PROCEDURE — 74177 CT ABD & PELVIS W/CONTRAST: CPT

## 2020-05-14 RX ORDER — OXYBUTYNIN CHLORIDE 5 MG/1
5 TABLET ORAL ONCE
Status: DISCONTINUED | OUTPATIENT
Start: 2020-05-14 | End: 2020-05-14 | Stop reason: CLARIF

## 2020-05-14 RX ORDER — KETOROLAC TROMETHAMINE 30 MG/ML
15 INJECTION, SOLUTION INTRAMUSCULAR; INTRAVENOUS ONCE
Status: COMPLETED | OUTPATIENT
Start: 2020-05-14 | End: 2020-05-14

## 2020-05-14 RX ORDER — SODIUM CHLORIDE 0.9 % (FLUSH) 0.9 %
10 SYRINGE (ML) INJECTION
Status: COMPLETED | OUTPATIENT
Start: 2020-05-14 | End: 2020-05-14

## 2020-05-14 RX ORDER — TROSPIUM CHLORIDE 20 MG/1
20 TABLET, FILM COATED ORAL ONCE
Status: COMPLETED | OUTPATIENT
Start: 2020-05-14 | End: 2020-05-14

## 2020-05-14 RX ORDER — OXYBUTYNIN CHLORIDE 5 MG/1
5 TABLET ORAL 2 TIMES DAILY
Qty: 20 TABLET | Refills: 0 | Status: SHIPPED | OUTPATIENT
Start: 2020-05-14 | End: 2021-09-27 | Stop reason: ALTCHOICE

## 2020-05-14 RX ORDER — ONDANSETRON 2 MG/ML
4 INJECTION INTRAMUSCULAR; INTRAVENOUS ONCE
Status: COMPLETED | OUTPATIENT
Start: 2020-05-14 | End: 2020-05-14

## 2020-05-14 RX ORDER — PHENAZOPYRIDINE HYDROCHLORIDE 200 MG/1
200 TABLET, FILM COATED ORAL 3 TIMES DAILY PRN
Qty: 15 TABLET | Refills: 1 | Status: SHIPPED | OUTPATIENT
Start: 2020-05-14 | End: 2020-05-19

## 2020-05-14 RX ADMIN — ONDANSETRON HYDROCHLORIDE 4 MG: 2 SOLUTION INTRAMUSCULAR; INTRAVENOUS at 15:15

## 2020-05-14 RX ADMIN — TROSPIUM CHLORIDE 20 MG: 20 TABLET ORAL at 18:37

## 2020-05-14 RX ADMIN — IOPAMIDOL 75 ML: 755 INJECTION, SOLUTION INTRAVENOUS at 16:20

## 2020-05-14 RX ADMIN — SODIUM CHLORIDE, PRESERVATIVE FREE 10 ML: 5 INJECTION INTRAVENOUS at 16:20

## 2020-05-14 RX ADMIN — KETOROLAC TROMETHAMINE 15 MG: 30 INJECTION, SOLUTION INTRAMUSCULAR; INTRAVENOUS at 15:16

## 2020-05-14 ASSESSMENT — PAIN DESCRIPTION - ORIENTATION: ORIENTATION: LOWER

## 2020-05-14 ASSESSMENT — PAIN DESCRIPTION - PAIN TYPE: TYPE: ACUTE PAIN

## 2020-05-14 ASSESSMENT — PAIN SCALES - GENERAL
PAINLEVEL_OUTOF10: 8
PAINLEVEL_OUTOF10: 6
PAINLEVEL_OUTOF10: 8

## 2020-05-14 ASSESSMENT — PAIN DESCRIPTION - LOCATION: LOCATION: ABDOMEN

## 2020-05-14 NOTE — ED PROVIDER NOTES
EMERGENCY DEPARTMENT ENCOUNTER      PCP: Daniel Hanley MD    [address] [place] [address]    Chief Complaint   Patient presents with    Abdominal Pain     lower and started 3 weeks ago; pt states that she was put on an antibiotic for a possible UTI; pt is having problems emptying her bladder as well       This patient was not evaluated by the attending physician. I have independently evaluated this patient. SOO Azevedo is a 28 y.o. female who presents with suprapubic pain and urinary retention. Onset was about 1 month ago. Patient reports that her PCP called in a prescription for nitrofurantoin when her symptoms originally began as patient was concerned for UTI because she was having urinary frequency, urgency, suprapubic discomfort, and felt like she could not fully urinate. Patient finished this antibiotic but symptoms continued and seemed to gradually worsen. Yesterday patient was started on ciprofloxacin and had a urinalysis that was negative in the office and gonorrhea and Chlamydia testing that was also negative in the office. Urine culture in process. Patient has taken 3 dosages of this medication since yesterday. She reports that lately after sleeping all night she would need to urinate very badly but is only able to urinate a very small amount. Patient reports associated nausea but denies emesis. She reports associated chronic nonbloody diarrhea for the past year, and dyspareunia which has been ongoing for approximately 5 months. Patient denies fever, chills, vaginal discharge, vaginal bleeding, hematuria, flank pain, back pain, constipation, melena, hematochezia, hematemesis, emesis bowel or bladder incontinence, saddle anesthesia. Denies concern for pregnancy and denies concern for STIs.     REVIEW OF SYSTEMS    Constitutional:  Denies fever, chills  HENT:  Denies sore throat or ear pain   Cardiovascular:  Denies chest pain, palpitations or swelling   Respiratory:  Denies cough or abdominal tenderness palpation on exam, no rebound tenderness, no palpable pulsatile masses,   No McBurney's point tenderness   Negative Rovsing sign    Negative Loza's sign. Back:   No CVA tenderness to percussion.   Musculoskeletal:  No edema, no deformity  Vascular: DP pulses 2+ equal bilaterally  Integument: No rash, dry skin  Neurologic:  Alert & oriented, normal speech  Psychiatric: Cooperative, pleasant affect       LABS:  Results for orders placed or performed during the hospital encounter of 05/14/20   Wet prep, genital   Result Value Ref Range    Specimen VAGINA     Special Requests NONE     Wet Prep NO TRICHOMONAS OR YEAST NOTED ON DIRECT WET PREP (A)    Comprehensive Metabolic Panel   Result Value Ref Range    Sodium 136 135 - 145 MMOL/L    Potassium 3.6 3.5 - 5.1 MMOL/L    Chloride 97 (L) 99 - 110 mMol/L    CO2 26 21 - 32 MMOL/L    BUN 6 6 - 23 MG/DL    CREATININE 0.8 0.6 - 1.1 MG/DL    Glucose 94 70 - 99 MG/DL    Calcium 9.2 8.3 - 10.6 MG/DL    Alb 4.4 3.4 - 5.0 GM/DL    Total Protein 7.3 6.4 - 8.2 GM/DL    Total Bilirubin 1.2 (H) 0.0 - 1.0 MG/DL    ALT 22 10 - 40 U/L    AST 22 15 - 37 IU/L    Alkaline Phosphatase 69 40 - 129 IU/L    GFR Non-African American >60 >60 mL/min/1.73m2    GFR African American >60 >60 mL/min/1.73m2    Anion Gap 13 4 - 16   CBC Auto Differential   Result Value Ref Range    WBC 8.2 4.0 - 10.5 K/CU MM    RBC 4.26 4.2 - 5.4 M/CU MM    Hemoglobin 12.9 12.5 - 16.0 GM/DL    Hematocrit 40.3 37 - 47 %    MCV 94.6 78 - 100 FL    MCH 30.3 27 - 31 PG    MCHC 32.0 32.0 - 36.0 %    RDW 13.1 11.7 - 14.9 %    Platelets 153 416 - 529 K/CU MM    MPV 10.6 7.5 - 11.1 FL    Differential Type AUTOMATED DIFFERENTIAL     Segs Relative 70.9 (H) 36 - 66 %    Lymphocytes % 17.0 (L) 24 - 44 %    Monocytes % 8.0 (H) 0 - 4 %    Eosinophils % 2.8 0 - 3 %    Basophils % 0.9 0 - 1 %    Segs Absolute 5.8 K/CU MM    Lymphocytes Absolute 1.4 K/CU MM    Monocytes Absolute 0.7 K/CU MM    Eosinophils Absolute

## 2020-05-14 NOTE — TELEPHONE ENCOUNTER
Please contact patient. Her urinalysis yesterday was completely negative, fortunately, with no signs of infection or bleeding. She likely is having bladder spasm and that can cause a blockage and also a problem emptying her bladder. If she has had 2 doses of Cipro and is not doing better she may need seen at urgent care or ER if her level of pain is that high. Certainly if she has severe back pain or nausea or fever she would need evaluated at the emergency room. It is hard to know exactly what is going on since the most obvious problem of a bladder infection seems unlikely. A stone is also unlikely given there is no blood in her urine. There could be another pelvic problem which would require a pelvic exam and additional testing. I did refill Pyridium which she had a year ago in April for a bladder infection. She can try that through the day today but if still unwell tomorrow probably ER visit would be necessary.

## 2020-06-05 ENCOUNTER — TELEPHONE (OUTPATIENT)
Dept: FAMILY MEDICINE CLINIC | Age: 35
End: 2020-06-05

## 2020-06-05 RX ORDER — FLUOXETINE HYDROCHLORIDE 20 MG/1
CAPSULE ORAL
Qty: 90 CAPSULE | Refills: 0 | OUTPATIENT
Start: 2020-06-05

## 2020-06-05 RX ORDER — FLUOXETINE HYDROCHLORIDE 40 MG/1
40 CAPSULE ORAL DAILY
Qty: 30 CAPSULE | Refills: 3 | Status: SHIPPED | OUTPATIENT
Start: 2020-06-05 | End: 2021-09-09

## 2020-07-29 RX ORDER — DESOGESTREL AND ETHINYL ESTRADIOL 0.15-0.03
KIT ORAL
Qty: 84 TABLET | Refills: 0 | Status: SHIPPED | OUTPATIENT
Start: 2020-07-29 | End: 2021-09-09

## 2021-09-09 ENCOUNTER — OFFICE VISIT (OUTPATIENT)
Dept: FAMILY MEDICINE CLINIC | Age: 36
End: 2021-09-09
Payer: COMMERCIAL

## 2021-09-09 VITALS
OXYGEN SATURATION: 96 % | DIASTOLIC BLOOD PRESSURE: 82 MMHG | HEART RATE: 102 BPM | WEIGHT: 168 LBS | BODY MASS INDEX: 25.54 KG/M2 | SYSTOLIC BLOOD PRESSURE: 122 MMHG

## 2021-09-09 DIAGNOSIS — R19.7 DIARRHEA, UNSPECIFIED TYPE: ICD-10-CM

## 2021-09-09 DIAGNOSIS — F41.9 ANXIETY: Primary | ICD-10-CM

## 2021-09-09 DIAGNOSIS — R10.31 COLICKY RLQ ABDOMINAL PAIN: ICD-10-CM

## 2021-09-09 PROCEDURE — 99214 OFFICE O/P EST MOD 30 MIN: CPT | Performed by: FAMILY MEDICINE

## 2021-09-09 RX ORDER — BUPROPION HYDROCHLORIDE 150 MG/1
150 TABLET ORAL EVERY MORNING
Qty: 30 TABLET | Refills: 3 | Status: SHIPPED | OUTPATIENT
Start: 2021-09-09 | End: 2021-10-05

## 2021-09-09 ASSESSMENT — ANXIETY QUESTIONNAIRES
1. FEELING NERVOUS, ANXIOUS, OR ON EDGE: 3
4. TROUBLE RELAXING: 1
GAD7 TOTAL SCORE: 16
6. BECOMING EASILY ANNOYED OR IRRITABLE: 3
7. FEELING AFRAID AS IF SOMETHING AWFUL MIGHT HAPPEN: 3
5. BEING SO RESTLESS THAT IT IS HARD TO SIT STILL: 0
3. WORRYING TOO MUCH ABOUT DIFFERENT THINGS: 3
2. NOT BEING ABLE TO STOP OR CONTROL WORRYING: 3

## 2021-09-09 ASSESSMENT — PATIENT HEALTH QUESTIONNAIRE - PHQ9
6. FEELING BAD ABOUT YOURSELF - OR THAT YOU ARE A FAILURE OR HAVE LET YOURSELF OR YOUR FAMILY DOWN: 3
2. FEELING DOWN, DEPRESSED OR HOPELESS: 2
5. POOR APPETITE OR OVEREATING: 2
9. THOUGHTS THAT YOU WOULD BE BETTER OFF DEAD, OR OF HURTING YOURSELF: 0
7. TROUBLE CONCENTRATING ON THINGS, SUCH AS READING THE NEWSPAPER OR WATCHING TELEVISION: 0
SUM OF ALL RESPONSES TO PHQ QUESTIONS 1-9: 12
SUM OF ALL RESPONSES TO PHQ QUESTIONS 1-9: 12
10. IF YOU CHECKED OFF ANY PROBLEMS, HOW DIFFICULT HAVE THESE PROBLEMS MADE IT FOR YOU TO DO YOUR WORK, TAKE CARE OF THINGS AT HOME, OR GET ALONG WITH OTHER PEOPLE: 3
1. LITTLE INTEREST OR PLEASURE IN DOING THINGS: 2
3. TROUBLE FALLING OR STAYING ASLEEP: 0
SUM OF ALL RESPONSES TO PHQ9 QUESTIONS 1 & 2: 4
SUM OF ALL RESPONSES TO PHQ QUESTIONS 1-9: 12
4. FEELING TIRED OR HAVING LITTLE ENERGY: 3
8. MOVING OR SPEAKING SO SLOWLY THAT OTHER PEOPLE COULD HAVE NOTICED. OR THE OPPOSITE, BEING SO FIGETY OR RESTLESS THAT YOU HAVE BEEN MOVING AROUND A LOT MORE THAN USUAL: 0

## 2021-09-09 ASSESSMENT — COLUMBIA-SUICIDE SEVERITY RATING SCALE - C-SSRS
6. HAVE YOU EVER DONE ANYTHING, STARTED TO DO ANYTHING, OR PREPARED TO DO ANYTHING TO END YOUR LIFE?: NO
1. WITHIN THE PAST MONTH, HAVE YOU WISHED YOU WERE DEAD OR WISHED YOU COULD GO TO SLEEP AND NOT WAKE UP?: NO
2. HAVE YOU ACTUALLY HAD ANY THOUGHTS OF KILLING YOURSELF?: NO

## 2021-09-09 NOTE — PROGRESS NOTES
Chief Complaint   Patient presents with    Anxiety     pt stopped taking prozac due to weight gain and low sex drive. pt reports anxiety is back and unable to manage on her own at this point and would like to discuss other medication options     Diarrhea     pt states she has diarrhea every day typically right after eating for last couple of years     Other     pt denied flu vax as she will get it free through her employer        [place] NARRATIVE:    Stopped porozac a year ago, but in hindsight at least one friend says it was helping. Now reports worsened sx of anxiety, chest tightness, stomach issues, no sleep     IBS (daily diarrhea) for a few years, reports problems with corn and popcorn but also ice cream and tortilla chips. Consumes very little dairy. No LLQ pain but once episode of stabiing RLQ pain. Patient is established unless otherwise noted. Above chief complaint and [place] obtained by this physician provider. Review of Systems   Constitutional: Negative for activity change, chills, fatigue and fever. HENT: Negative for congestion. Respiratory: Negative for cough, chest tightness, shortness of breath and wheezing. Cardiovascular: Negative for chest pain and leg swelling. Gastrointestinal: Positive for abdominal pain and diarrhea. Negative for blood in stool. Musculoskeletal: Negative for back pain and myalgias. Skin: Negative for rash. Psychiatric/Behavioral: Positive for dysphoric mood. Negative for behavioral problems. The patient is nervous/anxious. No Known Allergies  Allergy historyupdated.     Outpatient Medications Marked as Taking for the 9/9/21 encounter (Office Visit) with Star Saleh MD   Medication Sig Dispense Refill    buPROPion (WELLBUTRIN XL) 150 MG extended release tablet Take 1 tablet by mouth every morning 30 tablet 3    Fexofenadine-Pseudoephedrine (ALLEGRA-D PO) Take by mouth      acetaminophen (TYLENOL) 500 MG tablet Take 500 mg by mouth every 6 hours as needed for Pain         Tobacco use history updated. Social History     Tobacco Use   Smoking Status Never Smoker   Smokeless Tobacco Never Used        Nursing note reviewed. Vitals:    09/09/21 1110   BP: 122/82   Site: Left Upper Arm   Position: Sitting   Cuff Size: Medium Adult   Pulse: 102   SpO2: 96%   Weight: 168 lb (76.2 kg)          BP Readings from Last 3 Encounters:   09/09/21 122/82   05/14/20 131/84   04/16/19 120/70     Wt Readings from Last 3 Encounters:   09/09/21 168 lb (76.2 kg)   05/14/20 145 lb (65.8 kg)   04/16/19 171 lb 9.6 oz (77.8 kg)     Body mass index is 25.54 kg/m². No results found for this visit on 09/09/21. Physical Exam  Vitals and nursing note reviewed. Constitutional:       General: She is not in acute distress. Appearance: She is well-developed. She is not diaphoretic. HENT:      Head: Normocephalic. Eyes:      General:         Right eye: No discharge. Left eye: No discharge. Conjunctiva/sclera: Conjunctivae normal.      Pupils: Pupils are equal, round, and reactive to light. Cardiovascular:      Rate and Rhythm: Normal rate and regular rhythm. Heart sounds: Normal heart sounds. No murmur heard. Pulmonary:      Effort: Pulmonary effort is normal. No respiratory distress. Breath sounds: Normal breath sounds. No wheezing or rales. Abdominal:      General: Abdomen is flat. There is no distension. Tenderness: There is abdominal tenderness (minimal diffuse tenderness). Musculoskeletal:      Cervical back: Normal range of motion. Skin:     General: Skin is warm and dry. Neurological:      Mental Status: She is alert and oriented to person, place, and time. Psychiatric:         Behavior: Behavior normal.           _________________________________________________  Assessment:     1. Anxiety  -     buPROPion (WELLBUTRIN XL) 150 MG extended release tablet;  Take 1 tablet by mouth every morning, Disp-30 tablet, R-3Normal  2. Diarrhea, unspecified type  -     Melissa Lindsey CNP, Gastroenterology, [place]  3. [place] RLQ abdominal pain  -     Melissa Lindsey CNP, Gastroenterology, [place]    Anxiety is clearly worse, we will restart wellbutrin. Counselling support is recommended. We will ask GI provider to help with chronic diarrhea, possibly IBS but other issues need ruled out. IBS could e worsened by recent anxiety. See orders above and comments below for details of workup or medication orders. _________________________________________________  Plan:     Return in about 2 months (around 11/9/2021), or if symptoms worsen or fail to improve, for recheck on new med and f-u GI consult.       Electronically signed by Deejay Reyes MD on 9/9/21 at 11:20 AM EDT

## 2021-09-25 ASSESSMENT — ENCOUNTER SYMPTOMS
WHEEZING: 0
CHEST TIGHTNESS: 0
COUGH: 0
BLOOD IN STOOL: 0
DIARRHEA: 1
BACK PAIN: 0
SHORTNESS OF BREATH: 0
ABDOMINAL PAIN: 1

## 2021-09-27 ENCOUNTER — OFFICE VISIT (OUTPATIENT)
Dept: GASTROENTEROLOGY | Age: 36
End: 2021-09-27
Payer: COMMERCIAL

## 2021-09-27 VITALS
HEART RATE: 80 BPM | HEIGHT: 68 IN | SYSTOLIC BLOOD PRESSURE: 124 MMHG | OXYGEN SATURATION: 95 % | TEMPERATURE: 98.2 F | WEIGHT: 167.6 LBS | DIASTOLIC BLOOD PRESSURE: 76 MMHG | BODY MASS INDEX: 25.4 KG/M2

## 2021-09-27 DIAGNOSIS — Z01.818 PREOP TESTING: ICD-10-CM

## 2021-09-27 DIAGNOSIS — R14.0 BLOATING: ICD-10-CM

## 2021-09-27 DIAGNOSIS — R10.13 DYSPEPSIA: ICD-10-CM

## 2021-09-27 DIAGNOSIS — R10.9 ABDOMINAL CRAMPING: ICD-10-CM

## 2021-09-27 DIAGNOSIS — R19.4 ALTERED BOWEL HABITS: Primary | ICD-10-CM

## 2021-09-27 DIAGNOSIS — R19.7 DIARRHEA, UNSPECIFIED TYPE: ICD-10-CM

## 2021-09-27 DIAGNOSIS — R11.0 NAUSEA: ICD-10-CM

## 2021-09-27 PROCEDURE — 99204 OFFICE O/P NEW MOD 45 MIN: CPT | Performed by: NURSE PRACTITIONER

## 2021-09-27 RX ORDER — POLYETHYLENE GLYCOL 3350 17 G/17G
238 POWDER, FOR SOLUTION ORAL ONCE
Qty: 238 G | Refills: 0 | Status: SHIPPED | OUTPATIENT
Start: 2021-09-27 | End: 2021-09-27

## 2021-09-27 RX ORDER — DICYCLOMINE HYDROCHLORIDE 10 MG/1
10 CAPSULE ORAL 4 TIMES DAILY
Qty: 120 CAPSULE | Refills: 3 | Status: ON HOLD | OUTPATIENT
Start: 2021-09-27 | End: 2022-06-02

## 2021-09-27 RX ORDER — ALUMINUM ZIRCONIUM OCTACHLOROHYDREX GLY 16 G/100G
1 GEL TOPICAL DAILY
Qty: 30 CAPSULE | Refills: 4 | Status: SHIPPED | OUTPATIENT
Start: 2021-09-27 | End: 2021-10-27

## 2021-09-27 RX ORDER — BISACODYL 5 MG
TABLET, DELAYED RELEASE (ENTERIC COATED) ORAL
Qty: 4 TABLET | Refills: 0 | Status: ON HOLD | OUTPATIENT
Start: 2021-09-27 | End: 2022-06-02

## 2021-09-27 ASSESSMENT — ENCOUNTER SYMPTOMS
PHOTOPHOBIA: 0
ABDOMINAL PAIN: 1
COLOR CHANGE: 0
WHEEZING: 0
DIARRHEA: 1
EYE PAIN: 0
COUGH: 0
BACK PAIN: 1
BLOOD IN STOOL: 0
CONSTIPATION: 0
NAUSEA: 1
VOMITING: 0
SHORTNESS OF BREATH: 0

## 2021-09-27 NOTE — PROGRESS NOTES
Shayla Badillo 39 y.o. female was seen by JACLYN Herrera on 9/27/2021     Wt Readings from Last 3 Encounters:   09/27/21 167 lb 9.6 oz (76 kg)   09/09/21 168 lb (76.2 kg)   05/14/20 145 lb (65.8 kg)       SOO  Shayla Badillo is a pleasant 39 y.o.  female who presents today for abdominal pain, altered bowel habits with diarrhea, dyspepsia and nausea. She has a past medical history of seasonal allergic rhinitis. Ibuprofen 400 mg use once a week. She has never had an EGD or colonoscopy. Her bowel pattern changed two years ago. Prior that she was having bowel movements once every three days with hard small brown stools. She denied diet changes, recent travel or sick contacts. Her current bowel pattern is three times daily with watery brown stools. Imodium does not help. Eating/drinking dairy products worsens. No constipation. No blood in her stools or melena. No excess belching or flatulence. Her appetite is good without early satiety. Her weight is up twenty pounds in the last year. No known gluten intolerance. She has intermittent mild to moderate lower abdominal cramping described like menstrual cramps in her lower abdomen with no correlation to menstrual cycle. In the last six months she has bloating on most days and post prandial fullness sensation soon eating. She has nausea in the morning. No vomiting. Intermittent heartburn that started a year ago. Tums at night helps. Nocturnal awakenings with acid reflux once every couple of weeks. No dysphagia or pain with swallowing. No family history of stomach or colon cancer. ROS  Review of Systems   Constitutional: Positive for fatigue. Negative for appetite change, chills, diaphoresis, fever and unexpected weight change. HENT: Negative for ear pain, hearing loss and tinnitus. Eyes: Negative for photophobia and pain. Respiratory: Negative for cough, shortness of breath and wheezing.     Cardiovascular: Positive for palpitations. Negative for chest pain and leg swelling. Gastrointestinal: Positive for abdominal pain, diarrhea and nausea. Negative for blood in stool, constipation and vomiting. Endocrine: Negative for cold intolerance, heat intolerance and polydipsia. Genitourinary: Negative for dysuria, frequency and urgency. Musculoskeletal: Positive for back pain, myalgias and neck pain. Skin: Negative for color change, pallor and rash. Allergic/Immunologic: Negative for environmental allergies and food allergies. Neurological: Negative for dizziness, seizures and weakness. Headaches: intermittent. Hematological: Does not bruise/bleed easily. Psychiatric/Behavioral: Positive for dysphoric mood. Negative for sleep disturbance and suicidal ideas. The patient is nervous/anxious. Allergies  No Known Allergies    Medications  Current Outpatient Medications   Medication Sig Dispense Refill    buPROPion (WELLBUTRIN XL) 150 MG extended release tablet Take 1 tablet by mouth every morning 30 tablet 3    Fexofenadine-Pseudoephedrine (ALLEGRA-D PO) Take by mouth      acetaminophen (TYLENOL) 500 MG tablet Take 500 mg by mouth every 6 hours as needed for Pain       No current facility-administered medications for this visit. Past medical history:   She has a past medical history of Seasonal allergic rhinitis. Past surgical history:  She has a past surgical history that includes [place] tooth extraction (1999). Social History:  She reports that she has never smoked. She has never used smokeless tobacco. She reports current alcohol use. She reports that she does not use drugs. Family history:  Her family history includes Cancer in her maternal aunt; Diabetes in her paternal grandfather; High Blood Pressure in her paternal grandfather; High Cholesterol in her paternal grandfather; Kidney Disease in her paternal grandmother.     Objective    Vitals:    09/27/21 1546   BP: 124/76   Pulse: 80   Temp: 98.2 °F (36.8 °C)   SpO2: 95%        Physical exam    Physical Exam  Vitals reviewed. Constitutional:       General: She is not in acute distress. Appearance: Normal appearance. She is well-developed. She is not ill-appearing, toxic-appearing or diaphoretic. HENT:      Head: Normocephalic and atraumatic. Nose: Nose normal.      Mouth/Throat:      Mouth: Mucous membranes are moist.   Eyes:      Conjunctiva/sclera: Conjunctivae normal.      Pupils: Pupils are equal, round, and reactive to light. Neck:      Thyroid: No thyromegaly. Vascular: No JVD. Trachea: No tracheal deviation. Cardiovascular:      Rate and Rhythm: Normal rate and regular rhythm. Pulses: Normal pulses. Heart sounds: Normal heart sounds. No murmur heard. No friction rub. No gallop. Pulmonary:      Effort: Pulmonary effort is normal. No respiratory distress. Breath sounds: Normal breath sounds. No stridor. No wheezing, rhonchi or rales. Chest:      Chest wall: No tenderness. Abdominal:      General: Bowel sounds are normal. There is no distension. Palpations: Abdomen is soft. There is no mass. Tenderness: There is no abdominal tenderness. There is no guarding or rebound. Hernia: No hernia is present. Musculoskeletal:         General: Normal range of motion. Cervical back: Normal range of motion and neck supple. Lymphadenopathy:      Cervical: No cervical adenopathy. Skin:     General: Skin is warm and dry. Neurological:      Mental Status: She is alert and oriented to person, place, and time. Psychiatric:         Mood and Affect: Mood normal.         No visits with results within 2 Month(s) from this visit.    Latest known visit with results is:   Admission on 05/14/2020, Discharged on 05/14/2020   Component Date Value Ref Range Status    Sodium 05/14/2020 136  135 - 145 MMOL/L Final    Potassium 05/14/2020 3.6  3.5 - 5.1 MMOL/L Final    Chloride 05/14/2020 97* 99 - 110 mMol/L Final    CO2 05/14/2020 26  21 - 32 MMOL/L Final    BUN 05/14/2020 6  6 - 23 MG/DL Final    CREATININE 05/14/2020 0.8  0.6 - 1.1 MG/DL Final    Glucose 05/14/2020 94  70 - 99 MG/DL Final    Calcium 05/14/2020 9.2  8.3 - 10.6 MG/DL Final    Albumin 05/14/2020 4.4  3.4 - 5.0 GM/DL Final    Total Protein 05/14/2020 7.3  6.4 - 8.2 GM/DL Final    Total Bilirubin 05/14/2020 1.2* 0.0 - 1.0 MG/DL Final    ALT 05/14/2020 22  10 - 40 U/L Final    AST 05/14/2020 22  15 - 37 IU/L Final    Alkaline Phosphatase 05/14/2020 69  40 - 129 IU/L Final    GFR Non- 05/14/2020 >60  >60 mL/min/1.73m2 Final    GFR  05/14/2020 >60  >60 mL/min/1.73m2 Final    Anion Gap 05/14/2020 13  4 - 16 Final    WBC 05/14/2020 8.2  4.0 - 10.5 K/CU MM Final    RBC 05/14/2020 4.26  4.2 - 5.4 M/CU MM Final    Hemoglobin 05/14/2020 12.9  12.5 - 16.0 GM/DL Final    Hematocrit 05/14/2020 40.3  37 - 47 % Final    MCV 05/14/2020 94.6  78 - 100 FL Final    MCH 05/14/2020 30.3  27 - 31 PG Final    MCHC 05/14/2020 32.0  32.0 - 36.0 % Final    RDW 05/14/2020 13.1  11.7 - 14.9 % Final    Platelets 52/03/3141 283  140 - 440 K/CU MM Final    MPV 05/14/2020 10.6  7.5 - 11.1 FL Final    Differential Type 05/14/2020 AUTOMATED DIFFERENTIAL   Final    Segs Relative 05/14/2020 70.9* 36 - 66 % Final    Lymphocytes % 05/14/2020 17.0* 24 - 44 % Final    Monocytes % 05/14/2020 8.0* 0 - 4 % Final    Eosinophils % 05/14/2020 2.8  0 - 3 % Final    Basophils % 05/14/2020 0.9  0 - 1 % Final    Segs Absolute 05/14/2020 5.8  K/CU MM Final    Lymphocytes Absolute 05/14/2020 1.4  K/CU MM Final    Monocytes Absolute 05/14/2020 0.7  K/CU MM Final    Eosinophils Absolute 05/14/2020 0.2  K/CU MM Final    Basophils Absolute 05/14/2020 0.1  K/CU MM Final    Nucleated RBC % 05/14/2020 0.0  % Final    Total Nucleated RBC 05/14/2020 0.0  K/CU MM Final    Total Immature Neutrophil 05/14/2020 0.03  K/CU MM Final    Immature Neutrophil % 05/14/2020 0.4  0 - 0.43 % Final    Color, UA 05/14/2020 COLORLESS* YELLOW Final    Clarity, UA 05/14/2020 CLEAR  CLEAR Final    Glucose, Urine 05/14/2020 NEGATIVE  NEGATIVE MG/DL Final    Bilirubin Urine 05/14/2020 NEGATIVE  NEGATIVE MG/DL Final    Ketones, Urine 05/14/2020 NEGATIVE  NEGATIVE MG/DL Final    Specific Gravity, UA 05/14/2020 1.003  1.001 - 1.035 Final    Blood, Urine 05/14/2020 NEGATIVE  NEGATIVE Final    pH, Urine 05/14/2020 7.0  5.0 - 8.0 Final    Protein, UA 05/14/2020 NEGATIVE  NEGATIVE MG/DL Final    Urobilinogen, Urine 05/14/2020 NORMAL  0.2 - 1.0 MG/DL Final    Nitrite Urine, Quantitative 05/14/2020 NEGATIVE  NEGATIVE Final    Leukocyte Esterase, Urine 05/14/2020 NEGATIVE  NEGATIVE Final    RBC, UA 05/14/2020 NONE SEEN  0 - 6 /HPF Final    WBC, UA 05/14/2020 NONE SEEN  0 - 5 /HPF Final    Bacteria, UA 05/14/2020 OCCASIONAL* NEGATIVE /HPF Final    Squam Epithel, UA 05/14/2020 3  /HPF Final    Trichomonas, UA 05/14/2020 NONE SEEN  NONE SEEN /HPF Final    hCG Qual 05/14/2020 NEGATIVE   Final    Comment: (NOTE)  REFERENCE RANGE: NEGATIVE  A NEGATIVE VALUE OR VALUE IN THE INDETERMINATE RANGE DOES NOT RULE   OUT PREGNANCY AND IF NEEDED SHOULD BE REDRAWN IN 2 DAYS AND ASSAYED   AGAIN BECAUSE HCG VALUES IN NORMAL PREGNANCY DOUBLE EVERY 48 HRS. QUALITATIVE INTERPRETATION                                    NEGATIVE          <7.0                                    INDETERMINATE     7.0-25.0                                    POSITIVE          >25.0      Lipase 05/14/2020 41  13 - 60 IU/L Final    Specimen 05/14/2020 VAGINA   Final    Special Requests 05/14/2020 NONE   Final    Wet Prep 05/14/2020 NO TRICHOMONAS OR YEAST NOTED ON DIRECT WET PREP*  Final       Assessment and Plan:  1. Will plan for an EGD/colonoscopy with MAC anesthesia. The patient was informed of the risks and benefits of the procedures.   2.  Altered bowel habits with diarrhea will order stool studies to rule out infection vs. Inflammation. Will order lab work with CRP and celiac panel. Will order colonoscopy to rule out microscopic colitis. No known history of IBD and no complaints of rectal bleeding. The patient was provided with information on diarrhea. Will order Bentyl 10 mg take as needed for lower abdominal cramping. Recommend avoidance of foods that worsen and increase fruit, fiber and fluids. 3.  Abdominal bloating with lower abdominal cramping may be secondary to diet related abdominal bloating, celiac disease, small bowel bacterial overgrowth (SIBO), or irritable bowel syndrome (IBS). Will order Align for improved digestive wellness. Will order celiac panel and upper endoscopy to rule out celiac disease, as well as a dietician consultation to modify the diet. May consider a breathing test to rule out SIBO. Recommend a diet change,and fiber supplements. 4.  GERD vs. Dyspepsia with post prandial fullness will order EGD to rule out gastritis or peptic ulcer disease. The patient was instructed to avoid NSAID's. May consider treating with PPI or treatment for functional dyspepsia if normal results. The patient was encouraged to continue with anti-reflux measures, avoid eating late at night and avoid foods that worsen. 5.  Persistent nausea might be secondary to irritable bowel syndrome (IBS) or gastritis. The differential diagnosis include gastroparesis, celiac disease, non-ulcer dyspepsia and acid reflux. The patient was encouraged to take anti-nausea medication as needed. Will order endoscopy to rule out peptic ulcer disease or gastritis. Recommend weight loss and eating small frequent meals that are low in fat. 4.  Further recommendations for follow-up will be determined after the EGD/colonoscopy have been completed.

## 2021-09-27 NOTE — PATIENT INSTRUCTIONS
Patient Education        Upper GI Endoscopy: Before Your Procedure  What is an upper GI endoscopy? An upper gastrointestinal (or GI) endoscopy is a test that allows your doctor to look at the inside of your esophagus, stomach, and the first part of your small intestine, called the duodenum. The esophagus is the tube that carries food to your stomach. The doctor uses a thin, lighted tube that bends. It is called an endoscope, or scope. The doctor puts the tip of the scope in your mouth and gently moves it down your throat. The scope is a flexible video camera. The doctor looks at a monitor (like a TV set or a computer screen) as he or she moves the scope. A doctor may do this procedure to look for ulcers, tumors, infection, or bleeding. It also can be used to look for signs of acid backing up into your esophagus. This is called gastroesophageal reflux disease, or GERD. The doctor can use the scope to take a sample of tissue for study (a biopsy). The doctor also can use the scope to take out growths or stop bleeding. Follow-up care is a key part of your treatment and safety. Be sure to make and go to all appointments, and call your doctor if you are having problems. It's also a good idea to know your test results and keep a list of the medicines you take. How do you prepare for the procedure? Procedures can be stressful. This information will help you understand what you can expect. And it will help you safely prepare for your procedure. Preparing for the procedure    · Do not eat or drink anything for 6 to 8 hours before the test. An empty stomach helps your doctor see your stomach clearly during the test. It also reduces your chances of vomiting. If you vomit, there is a small risk that the vomit could enter your lungs.  (This is called aspiration.) If the test is done in an emergency, a tube may be inserted through your nose or mouth to empty your stomach.     · Do not take sucralfate (Carafate) or antacids on the day of the test. These medicines can make it hard for your doctor to see your upper GI tract.     · If your doctor tells you to, stop taking iron supplements 7 to 14 days before the test.     · Be sure you have someone to take you home. Anesthesia and pain medicine will make it unsafe for you to drive or get home on your own.     · Understand exactly what procedure is planned, along with the risks, benefits, and other options. · Tell your doctor ALL the medicines, vitamins, supplements, and herbal remedies you take. Some may increase the risk of problems during your procedure. Your doctor will tell you if you should stop taking any of them before the procedure and how soon to do it.     · If you take aspirin or some other blood thinner, ask your doctor if you should stop taking it before your procedure. Make sure that you understand exactly what your doctor wants you to do. These medicines increase the risk of bleeding.     · Make sure your doctor and the hospital have a copy of your advance directive. If you don't have one, you may want to prepare one. It lets others know your health care wishes. It's a good thing to have before any type of surgery or procedure. What happens on the day of the procedure? · Follow the instructions exactly about when to stop eating and drinking. If you don't, your procedure may be canceled. If your doctor told you to take your medicines on the day of the procedure, take them with only a sip of water.     · Take a bath or shower before you come in for your procedure. Do not apply lotions, perfumes, deodorants, or nail polish.     · Take off all jewelry and piercings. And take out contact lenses, if you wear them. At the hospital or surgery center   · Bring a picture ID.     · The test may take 15 to 30 minutes.     · The doctor may spray medicine on the back of your throat to numb it.  You also will get medicine to prevent pain and to relax you.     · You will lie on your left side. The doctor will put the scope in your mouth and toward the back of your throat. The doctor will tell you when to swallow. This helps the scope move down your throat. You will be able to breathe normally. The doctor will move the scope down your esophagus into your stomach. The doctor also may look at the duodenum.     · If your doctor wants to take a sample of tissue for a biopsy, he or she may use small surgical tools, which are put into the scope, to cut off some tissue. You will not feel a biopsy, if one is taken. The doctor also can use the tools to stop bleeding or to do other treatments, if needed.     · You will stay at the hospital or surgery center for 1 to 2 hours until the medicine you were given wears off. What happens after an upper GI endoscopy? · After the test, you may belch and feel bloated for a while.     · You may have a tickling, dry throat or mouth. You may feel a bit hoarse, and you may have a mild sore throat. These symptoms may last several days. Throat lozenges and warm saltwater gargles can help relieve the throat symptoms.     · Don't drive or operate machinery for 12 hours after the test.     · Your doctor will tell you when you can go back to your usual diet and activities.     · Don't drink alcohol for 12 to 24 hours after the test.   When should you call your doctor? · You have questions or concerns.     · You don't understand how to prepare for your procedure.     · You become ill before the procedure (such as fever, flu, or a cold).     · You need to reschedule or have changed your mind about having the procedure. Where can you learn more? Go to [email or web address] [email or web address] org and sign in to your Cometa account. Enter P790 in the Loccie box to learn more about \"Upper GI Endoscopy: Before Your Procedure. \"     If you do not have an account, please click on the \"Sign Up Now\" link.   Current as of: February 10, 2021               Content Version: 13.0  © 2006-2021 Healthwise, [place]. Care instructions adapted under license by [facility] [place] ([facility]). If you have questions about a medical condition or this instruction, always ask your healthcare professional. [address] any warranty or liability for your use of this information. Patient Education        Colonoscopy: Before Your Procedure  What is a colonoscopy? A colonoscopy is a test that lets a doctor look inside your colon. The doctor uses a thin, lighted tube called a colonoscope to look for problems. These include small growths called polyps, cancer, or bleeding. During the test, the doctor can take samples of tissue that can be checked for cancer or other problems. This is called a biopsy. The doctor can also take out polyps. Before the test, you will need to stop eating solid foods. You also will be given instructions on how to clean out your colon. This helps your doctor be able to see inside your colon during the test.  How do you prepare for the procedure? Procedures can be stressful. This information will help you understand what you can expect. And it will help you safely prepare for your procedure. Preparing for the procedure    · Be sure you have someone to take you home. Anesthesia and pain medicine will make it unsafe for you to drive or get home on your own. · Understand exactly what procedure is planned, along with the risks, benefits, and other options.     · Tell your doctor ALL the medicines, vitamins, supplements, and herbal remedies you take. Some may increase the risk of problems during your procedure. Your doctor will tell you if you should stop taking any of them before the procedure and how soon to do it.     · If you take aspirin or some other blood thinner, ask your doctor if you should stop taking it before your procedure. Make sure that you understand exactly what your doctor wants you to do.  These medicines increase the risk of bleeding.     · Make sure your doctor and the hospital have a copy of your advance directive. If you don't have one, you may want to prepare one. It lets others know your health care wishes. It's a good thing to have before any type of surgery or procedure. Before the procedure    · Follow your doctor's directions about when to stop eating solid foods and drink only clear liquids. You can drink water, clear juices, clear broths, flavored ice pops, and gelatin (such as Jell-O). Do not eat or drink anything red or purple. This includes grape juice and grape-flavored ice pops. It also includes fruit punch and cherry gelatin.     · Drink the \"colon prep\" liquid as your doctor tells you. You will want to stay home, because the liquid will make you go to the bathroom a lot. Your stools will be loose and watery. It's very important to drink all of the liquid. If you have problems drinking it, call your doctor.     · Do not eat any solid foods after you drink the colon prep.     · Stop drinking clear liquids for a few hours before the test. Your doctor will tell you how many hours this will be. What happens on the day of the procedure? · Follow the instructions exactly about when to stop eating and drinking. If you don't, your procedure may be canceled. If your doctor told you to take your medicines on the day of the procedure, take them with only a sip of water.     · Take a bath or shower before you come in for your procedure. Do not apply lotions, perfumes, deodorants, or nail polish.     · Take off all jewelry and piercings. And take out contact lenses, if you wear them. At the doctor's office or hospital   · Bring a picture ID.     · You will be kept comfortable and safe by your anesthesia provider. The anesthesia may make you sleep.     · You will lie on your back or your side with your knees drawn up toward your belly. The doctor will gently put a gloved finger into your anus. Then the doctor puts the scope in and moves it into your colon. The scope goes in easily because it is lubricated.     · The doctor may also use small tools to take tissue samples for a biopsy or to remove polyps. This does not hurt.     · The test usually takes 30 to 45 minutes. But it may take longer. It depends on what is found and what is done. When should you call your doctor? · You have questions or concerns.     · You don't understand how to prepare for your procedure.     · You are having trouble with the bowel prep.     · You become ill before the procedure (such as fever, flu, or a cold).     · You need to reschedule or have changed your mind about having the procedure. Where can you learn more? Go to [email or web address] and sign in to your Kin[employer]ix account. Enter C315 in the Zumper box to learn more about \"Colonoscopy: Before Your Procedure. \"     If you do not have an account, please click on the \"Sign Up Now\" link. Current as of: December 17, 2020               Content Version: 13.0  © 9406-2611 [employer]. Care instructions adapted under license by [facility] ([facility]). If you have questions about a medical condition or this instruction, always ask your healthcare professional. [place] [address] any warranty or liability for your use of this information. Patient Education        dicyclomine  Pronunciation:  dye SYE kloe meen  Brand:  Armand  What is the most important information I should know about dicyclomine? This medication may impair your thinking or reactions. Be careful if you drive or do anything that requires you to be alert. Drinking alcohol can increase certain side effects of dicyclomine. Avoid becoming overheated or dehydrated during exercise and in hot weather. Dicyclomine can decrease your sweating, which can lead to heat stroke in a hot environment.   Stop using dicyclomine and call your doctor right away if you have serious side effects such as confusion, hallucinations, unusual thoughts or behavior, fast or uneven heart rate, or if you urinate less than usual or not at all. There are many other medicines that can interact with dicyclomine. Tell your doctor about all the prescription and over-the-counter medications you use. This includes vitamins, minerals, herbal products, and drugs prescribed by other doctors. Do not start using a new medication without telling your doctor. Keep a list with you of all the medicines you use and show this list to any doctor or other healthcare provider who treats you. What is dicyclomine? Dicyclomine relieves spasms of the muscles in the stomach and intestines. Dicyclomine is used to treat functional bowel or irritable bowel syndrome. Dicyclomine may also be used for purposes not listed in this medication guide. What should I discuss with my healthcare provider before taking dicyclomine? You should not take this medication if you are allergic to dicyclomine, or if you have:  · problems with urination;  · a bowel obstruction or severe constipation;  · severe ulcerative colitis or toxic megacolon;  · gastroesophageal reflux disease (GERD);  · a serious heart condition or active bleeding;  · glaucoma;  · myasthenia gravis; or  · if you are breast-feeding a baby. To make sure you can safely take dicyclomine, tell your doctor if you have any of these other conditions:  · ulcerative colitis;  · an ileostomy or colostomy;  · a nerve problem (such as numbness or tingling);  · liver or kidney disease;  · heart disease, congestive heart failure, high blood pressure, or a heart rhythm disorder;  · hiatal hernia; or  · an enlarged prostate. FDA pregnancy category B. This medication is not expected to be harmful to an unborn baby. Tell your doctor if you are pregnant or plan to become pregnant during treatment.   Dicyclomine can pass into breast milk and can cause breathing problems or other life-threatening side effects in infants younger than 10months of age. Do not breast feed a baby while taking this medication. Older adults may be more likely to have side effects from this medicine. Dicyclomine should not be given to a child younger than 7 months old. How should I take dicyclomine? Take exactly as prescribed by your doctor. Do not take in larger or smaller amounts or for longer than recommended. Follow the directions on your prescription label. Dicyclomine is usually taken 4 times each day. Your doctor may occasionally change your dose to make sure you get the best results. Take this medicine with a full glass of water. Measure liquid medicine with a special dose-measuring spoon or cup, not a regular table spoon. If you do not have a dose-measuring device, ask your pharmacist for one. Talk with your doctor if your symptoms do not improve after 2 weeks of treatment. Store at room temperature away from moisture and heat. What happens if I miss a dose? Take the missed dose as soon as you remember. Skip the missed dose if it is almost time for your next scheduled dose. Do not take extra medicine to make up the missed dose. What happens if I overdose? Seek emergency medical attention or call the Poison Help line at 1-962.636.1627. Overdose symptoms may include nausea, vomiting, dilated pupils, weakness or loss of movement in any part of your body, trouble swallowing, fainting, or seizure (convulsions). What should I avoid while taking dicyclomine? This medication may cause blurred vision and may impair your thinking or reactions. Be careful if you drive or do anything that requires you to be alert and able to see clearly. Avoid becoming overheated or dehydrated during exercise and in hot weather. Dicyclomine can cause decreased sweating, which can lead to heat stroke in a hot environment. Drinking alcohol can increase certain side effects of dicyclomine.   Avoid using antacids without your doctor's advice. Use only the type of antacid your doctor recommends. Some antacids can make it harder for your body to absorb dicyclomine. What are the possible side effects of dicyclomine? Get emergency medical help if you have any of these signs of an allergic reaction: hives; difficult breathing; swelling of your face, lips, tongue, or throat. Stop using dicyclomine and call your doctor at once if you have a serious side effect such as:  · severe constipation, bloating, or stomach pain;  · worsening of diarrhea or other irritable bowel symptoms;  · feeling very thirsty or hot, being unable to urinate, heavy sweating, or hot and dry skin  · confusion, hallucinations, unusual thoughts or behavior; or  · pounding heartbeats or fluttering in your chest.  Less serious side effects may include:  · drowsiness, dizziness, weakness, nervousness;  · blurred vision;  · dry mouth, stuffy nose; or  · mild constipation. This is not a complete list of side effects and others may occur. Call your doctor for medical advice about side effects. You may report side effects to FDA at 2-216-FDA-7406. What other drugs will affect dicyclomine? Before using dicyclomine, tell your doctor if you regularly use other medicines that make you sleepy (such as cold or allergy medicine, sedatives, narcotic pain medicine, sleeping pills, muscle relaxers, and medicine for seizures, depression, or anxiety). They can add to sleepiness caused by dicyclomine.   Tell your doctor about all other medicines you use, especially:  · amantadine (Symmetrel);  · digoxin (Lanoxin, Lanoxicaps);  · metoclopramide (Reglan);  · atropine (Atreza, Federico-Tropine), belladonna (Donnatal, and others), benztropine (Cogentin), dimenhydrinate (Dramamine), methscopolamine (Pamine), or scopolamine (Transderm Scop);  · bronchodilators such as ipratropium (Atrovent) or tiotropium (Spiriva);  · bladder or urinary medications such as darifenacin (Enablex), flavoxate (Urispas), oxybutynin (Ditropan, Oxytrol), tolterodine (Detrol), or solifenacin (Vesicare);  · a heart rhythm medication such as quinidine (Dorothea-G), procainamide (Procan, Pronestyl), disopyramide (Norpace), flecaininde (Tambocor), mexiletine (Mexitil), propafenone, (Rythmol), and others;  · irritable bowel medications such as hyoscyamine (Hyomax) or propantheline (Pro Banthine);  · an MAO inhibitor such as furazolidone (Furoxone), isocarboxazid (Marplan), phenelzine (Nardil), rasagiline (Azilect), selegiline (Eldepryl, Emsam, Zelapar), or tranylcypromine (Parnate);  · nitrate medication, such as nitroglycerin (Nitro Dur, Nitrolingual, Nitrostat, Transderm Nitro, and others), isosorbide dinitrate (Dilatrate, Isordil, Isochron), or isosorbide mononitrate (Imdur, ISMO, Monoket);  · phenothiazines such as chlorpromazine (Thorazine), fluphenazine (Permitil, Prolixin), perphenazine (Trilafon), prochlorperazine (Compazine, Compro), promethazine (Pentazine, Phenergan, Anergan, Antinaus), thioridazine (Mellaril), or trifluoperazine (Stelazine);  · steroid medication such as prednisone and others; or  · ulcer medication such as glycopyrrolate (Robinul) or mepenzolate (Cantil). This list is not complete and other drugs may interact with dicyclomine. Tell your doctor about all medications you use. This includes prescription, over-the-counter, vitamin, and herbal products. Do not start a new medication without telling your doctor. Where can I get more information? Your pharmacist can provide more information about dicyclomine. Remember, keep this and all other medicines out of the reach of children, never share your medicines with others, and use this medication only for the indication prescribed. Every effort has been made to ensure that the information provided by [address] is accurate, up-to-date, and complete, but no guarantee is made to that effect.  Drug information contained herein may be time sensitive. [employer] information has been compiled for use by healthcare practitioners and consumers in the [place] and therefore [employer] does not warrant that uses outside of the [place] are appropriate, unless specifically indicated otherwise. [facility] drug information does not endorse drugs, diagnose patients or recommend therapy. [facility] drug information is an informational resource designed to assist licensed healthcare practitioners in caring for their patients and/or to serve consumers viewing this service as a supplement to, and not a substitute for, the expertise, skill, knowledge and judgment of healthcare practitioners. The absence of a warning for a given drug or drug combination in no way should be construed to indicate that the drug or drug combination is safe, effective or appropriate for any given patient. [facility] does not assume any responsibility for any aspect of healthcare administered with the aid of information [facility] provides. The information contained herein is not intended to cover all possible uses, directions, precautions, warnings, drug interactions, allergic reactions, or adverse effects. If you have questions about the drugs you are taking, check with your doctor, nurse or pharmacist.  Copyright 0109-1604 [address] Version: 5.01. Revision date: 11/8/2011. Care instructions adapted under license by [facility] ([facility]). If you have questions about a medical condition or this instruction, always ask your healthcare professional. [place] [address] any warranty or liability for your use of this information. Patient Education        Diarrhea: Care Instructions  Your Care Instructions     Diarrhea is loose, watery stools (bowel movements). The exact cause is often hard to find. Sometimes diarrhea is your body's way of getting rid of what caused an upset stomach. Viruses, food poisoning, and many medicines can cause diarrhea.  Some people get diarrhea in response to emotional stress, anxiety, or certain foods. Almost everyone has diarrhea now and then. It usually isn't serious, and your stools will return to normal soon. The important thing to do is replace the fluids you have lost, so you can prevent dehydration. The doctor has checked you carefully, but problems can develop later. If you notice any problems or new symptoms, get medical treatment right away. Follow-up care is a key part of your treatment and safety. Be sure to make and go to all appointments, and call your doctor if you are having problems. It's also a good idea to know your test results and keep a list of the medicines you take. How can you care for yourself at home? · Watch for signs of dehydration, which means your body has lost too much water. Dehydration is a serious condition and should be treated right away. Signs of dehydration are:  ? Increasing thirst and dry eyes and mouth. ? Feeling faint or lightheaded. ? A smaller amount of urine than normal.  · To prevent dehydration, drink plenty of fluids. Choose water and other clear liquids until you feel better. If you have kidney, heart, or liver disease and have to limit fluids, talk with your doctor before you increase the amount of fluids you drink. · When you feel like eating, start with small amounts of food. · The doctor may recommend that you take over-the-counter medicine, such as loperamide (Imodium), if you still have diarrhea after 6 hours. Read and follow all instructions on the label. Do not use this medicine if you have bloody diarrhea, a high fever, or other signs of serious illness. Call your doctor if you think you are having a problem with your medicine. When should you call for help? Call 911 anytime you think you may need emergency care. For example, call if:    · You passed out (lost consciousness).     · Your stools are maroon or very bloody.    Call your doctor now or seek immediate medical care if:    · You

## 2021-10-05 ENCOUNTER — TELEPHONE (OUTPATIENT)
Dept: GASTROENTEROLOGY | Age: 36
End: 2021-10-05

## 2021-10-05 DIAGNOSIS — F41.9 ANXIETY: ICD-10-CM

## 2021-10-05 RX ORDER — BUPROPION HYDROCHLORIDE 150 MG/1
TABLET ORAL
Qty: 30 TABLET | Refills: 3 | Status: SHIPPED | OUTPATIENT
Start: 2021-10-05 | End: 2021-10-14

## 2021-10-05 NOTE — TELEPHONE ENCOUNTER
First Attempt to Reach Patient. Left message for the patient to call back to schedule cscope and EGD.

## 2021-10-12 ENCOUNTER — HOSPITAL ENCOUNTER (OUTPATIENT)
Age: 36
Discharge: HOME | End: 2021-10-12
Payer: COMMERCIAL

## 2021-10-12 DIAGNOSIS — R19.7 DIARRHEA, UNSPECIFIED TYPE: ICD-10-CM

## 2021-10-12 LAB
ADENOVIRUS F 40 41 PCR: NOT DETECTED
ALBUMIN SERPL-MCNC: 4.7 GM/DL (ref 3.4–5)
ALP BLD-CCNC: 79 IU/L (ref 40–129)
ALT SERPL-CCNC: 18 U/L (ref 10–40)
AMYLASE: 61 U/L (ref 25–115)
AST SERPL-CCNC: 25 IU/L (ref 15–37)
ASTROVIRUS PCR: NOT DETECTED
BASOPHILS ABSOLUTE: 0.1 K/CU MM
BASOPHILS RELATIVE PERCENT: 0.7 % (ref 0–1)
BILIRUB SERPL-MCNC: 1.4 MG/DL (ref 0–1)
BILIRUBIN DIRECT: 0.3 MG/DL (ref 0–0.3)
BILIRUBIN, INDIRECT: 1.1 MG/DL (ref 0–0.7)
CAMPYLOBACTER PCR: NOT DETECTED
CRYPTOSPORIDIUM PCR: NOT DETECTED
CYCLOSPORA CAYETANENSIS PCR: NOT DETECTED
DIFFERENTIAL TYPE: ABNORMAL
E COLI 0157 PCR: NOT DETECTED
E COLI ENTEROAGGREGATIVE PCR: NOT DETECTED
E COLI ENTEROPATHOGENIC PCR: NOT DETECTED
E COLI ENTEROTOXIGENIC PCR: NOT DETECTED
E COLI SHIGA LIKE TOXIN PCR: NOT DETECTED
E COLI SHIGELLA/ENTEROINVASIVE PCR: NOT DETECTED
ENTAMOEBA HISTOLYTICA PCR: NOT DETECTED
EOSINOPHILS ABSOLUTE: 0.2 K/CU MM
EOSINOPHILS RELATIVE PERCENT: 2 % (ref 0–3)
GIARDIA LAMBLIA PCR: NOT DETECTED
HCT VFR BLD CALC: 42.4 % (ref 37–47)
HEMOGLOBIN: 13.4 GM/DL (ref 12.5–16)
HIGH SENSITIVE C-REACTIVE PROTEIN: 0.9 MG/L
IGA: 262 MG/DL (ref 69–382)
IGG,SERUM: 1050 MG/DL (ref 723–1685)
IMMATURE NEUTROPHIL %: 0.4 % (ref 0–0.43)
LIPASE: 49 IU/L (ref 13–60)
LYMPHOCYTES ABSOLUTE: 1.4 K/CU MM
LYMPHOCYTES RELATIVE PERCENT: 18.4 % (ref 24–44)
MCH RBC QN AUTO: 30 PG (ref 27–31)
MCHC RBC AUTO-ENTMCNC: 31.6 % (ref 32–36)
MCV RBC AUTO: 94.9 FL (ref 78–100)
MONOCYTES ABSOLUTE: 0.6 K/CU MM
MONOCYTES RELATIVE PERCENT: 7.8 % (ref 0–4)
NOROVIRUS GI GII PCR: NOT DETECTED
NUCLEATED RBC %: 0 %
PDW BLD-RTO: 12.3 % (ref 11.7–14.9)
PLATELET # BLD: 277 K/CU MM (ref 140–440)
PLESIOMONAS SHIGELLOIDES PCR: NOT DETECTED
PMV BLD AUTO: 11.4 FL (ref 7.5–11.1)
RBC # BLD: 4.47 M/CU MM (ref 4.2–5.4)
ROTAVIRUS A PCR: NOT DETECTED
SALMONELLA PCR: NOT DETECTED
SAPOVIRUS PCR: NOT DETECTED
SEGMENTED NEUTROPHILS ABSOLUTE COUNT: 5.3 K/CU MM
SEGMENTED NEUTROPHILS RELATIVE PERCENT: 70.7 % (ref 36–66)
TOTAL IMMATURE NEUTOROPHIL: 0.03 K/CU MM
TOTAL NUCLEATED RBC: 0 K/CU MM
TOTAL PROTEIN: 7.6 GM/DL (ref 6.4–8.2)
VIBRIO CHOLERAE PCR: NOT DETECTED
VIBRIO PCR: NOT DETECTED
VITAMIN D 25-HYDROXY: 51.49 NG/ML
WBC # BLD: 7.5 K/CU MM (ref 4–10.5)
YERSINIA ENTEROCOLITICA PCR: NOT DETECTED

## 2021-10-12 PROCEDURE — 82306 VITAMIN D 25 HYDROXY: CPT

## 2021-10-12 PROCEDURE — 87329 GIARDIA AG IA: CPT

## 2021-10-12 PROCEDURE — 87324 CLOSTRIDIUM AG IA: CPT

## 2021-10-12 PROCEDURE — 83516 IMMUNOASSAY NONANTIBODY: CPT

## 2021-10-12 PROCEDURE — 86141 C-REACTIVE PROTEIN HS: CPT

## 2021-10-12 PROCEDURE — 87177 OVA AND PARASITES SMEARS: CPT

## 2021-10-12 PROCEDURE — 82784 ASSAY IGA/IGD/IGG/IGM EACH: CPT

## 2021-10-12 PROCEDURE — 87209 SMEAR COMPLEX STAIN: CPT

## 2021-10-12 PROCEDURE — 83690 ASSAY OF LIPASE: CPT

## 2021-10-12 PROCEDURE — 36415 COLL VENOUS BLD VENIPUNCTURE: CPT

## 2021-10-12 PROCEDURE — 85025 COMPLETE CBC W/AUTO DIFF WBC: CPT

## 2021-10-12 PROCEDURE — 83993 ASSAY FOR CALPROTECTIN FECAL: CPT

## 2021-10-12 PROCEDURE — 87449 NOS EACH ORGANISM AG IA: CPT

## 2021-10-12 PROCEDURE — 82150 ASSAY OF AMYLASE: CPT

## 2021-10-12 PROCEDURE — 87385 HISTOPLASMA CAPSUL AG IA: CPT

## 2021-10-12 PROCEDURE — 80076 HEPATIC FUNCTION PANEL: CPT

## 2021-10-12 PROCEDURE — 87507 IADNA-DNA/RNA PROBE TQ 12-25: CPT

## 2021-10-13 DIAGNOSIS — F41.9 ANXIETY: ICD-10-CM

## 2021-10-13 LAB
REASON FOR REJECTION: NORMAL
REJECTED TEST: NORMAL

## 2021-10-14 RX ORDER — BUPROPION HYDROCHLORIDE 150 MG/1
TABLET ORAL
Qty: 90 TABLET | Refills: 1 | Status: ON HOLD | OUTPATIENT
Start: 2021-10-14 | End: 2022-06-02

## 2021-10-15 LAB
CALPROTECTIN, FECAL: 9 UG/G
HISTOPLASMA ANTIGEN URINE INTERP: NOT DETECTED
HISTOPLASMA ANTIGEN URINE: NOT DETECTED NG/ML
TRANSGLUTAMINASE IGA: <2 U/ML (ref 0–3)

## 2021-10-18 LAB — INTERPRETATION: NEGATIVE

## 2021-10-19 ENCOUNTER — TELEPHONE (OUTPATIENT)
Dept: GASTROENTEROLOGY | Age: 36
End: 2021-10-19

## 2021-10-21 ENCOUNTER — TELEPHONE (OUTPATIENT)
Dept: GASTROENTEROLOGY | Age: 36
End: 2021-10-21

## 2021-10-21 NOTE — TELEPHONE ENCOUNTER
Final Attempt to Reach Patient. Tried to call the patient to schedule cscope and EGD, but there was no answer and voicemail was full.

## 2021-12-20 ENCOUNTER — OFFICE VISIT (OUTPATIENT)
Dept: OBGYN | Age: 36
End: 2021-12-20
Payer: COMMERCIAL

## 2021-12-20 VITALS
BODY MASS INDEX: 25.91 KG/M2 | DIASTOLIC BLOOD PRESSURE: 71 MMHG | HEIGHT: 68 IN | SYSTOLIC BLOOD PRESSURE: 109 MMHG | WEIGHT: 171 LBS

## 2021-12-20 DIAGNOSIS — R11.2 NAUSEA AND VOMITING, INTRACTABILITY OF VOMITING NOT SPECIFIED, UNSPECIFIED VOMITING TYPE: ICD-10-CM

## 2021-12-20 DIAGNOSIS — N91.2 AMENORRHEA: Primary | ICD-10-CM

## 2021-12-20 LAB
CONTROL: NORMAL
PREGNANCY TEST URINE, POC: POSITIVE

## 2021-12-20 PROCEDURE — 81025 URINE PREGNANCY TEST: CPT | Performed by: OBSTETRICS & GYNECOLOGY

## 2021-12-20 PROCEDURE — 99213 OFFICE O/P EST LOW 20 MIN: CPT | Performed by: OBSTETRICS & GYNECOLOGY

## 2021-12-20 PROCEDURE — 76830 TRANSVAGINAL US NON-OB: CPT | Performed by: OBSTETRICS & GYNECOLOGY

## 2021-12-20 RX ORDER — THIAMINE MONONITRATE, RIBOFLAVIN, PYRIDOXINE HYDROCHLORIDE, CYANOCOBALAMIN, ASCORBIC ACID, NIACIN, FOLIC ACID, FERROUS FUMARATE, CALCIUM CARBONATE, CHOLECALCIFEROL, VITAMIN E ACETATE, POTASSIUM IODIDE, ZINC OXIDE, CHOLINE BITARTRATE, AND DOCONEXENT
1 KIT DAILY
Qty: 90 EACH | Refills: 3 | Status: SHIPPED | OUTPATIENT
Start: 2021-12-20

## 2021-12-20 SDOH — ECONOMIC STABILITY: FOOD INSECURITY: WITHIN THE PAST 12 MONTHS, THE FOOD YOU BOUGHT JUST DIDN'T LAST AND YOU DIDN'T HAVE MONEY TO GET MORE.: NEVER TRUE

## 2021-12-20 SDOH — ECONOMIC STABILITY: FOOD INSECURITY: WITHIN THE PAST 12 MONTHS, YOU WORRIED THAT YOUR FOOD WOULD RUN OUT BEFORE YOU GOT MONEY TO BUY MORE.: NEVER TRUE

## 2021-12-20 ASSESSMENT — ENCOUNTER SYMPTOMS
ALLERGIC/IMMUNOLOGIC NEGATIVE: 1
NAUSEA: 1
EYES NEGATIVE: 1
RESPIRATORY NEGATIVE: 1
VOMITING: 1

## 2021-12-20 ASSESSMENT — SOCIAL DETERMINANTS OF HEALTH (SDOH): HOW HARD IS IT FOR YOU TO PAY FOR THE VERY BASICS LIKE FOOD, HOUSING, MEDICAL CARE, AND HEATING?: NOT HARD AT ALL

## 2021-12-21 LAB
C. TRACHOMATIS DNA ,URINE: NEGATIVE
N. GONORRHOEAE DNA, URINE: NEGATIVE

## 2022-02-01 ENCOUNTER — HOSPITAL ENCOUNTER (OUTPATIENT)
Age: 37
Setting detail: SPECIMEN
Discharge: HOME | End: 2022-02-01
Payer: COMMERCIAL

## 2022-02-01 ENCOUNTER — INITIAL PRENATAL (OUTPATIENT)
Dept: OBGYN | Age: 37
End: 2022-02-01

## 2022-02-01 VITALS — DIASTOLIC BLOOD PRESSURE: 83 MMHG | WEIGHT: 174 LBS | SYSTOLIC BLOOD PRESSURE: 147 MMHG | BODY MASS INDEX: 26.46 KG/M2

## 2022-02-01 DIAGNOSIS — O09.521 MULTIGRAVIDA OF ADVANCED MATERNAL AGE IN FIRST TRIMESTER: ICD-10-CM

## 2022-02-01 DIAGNOSIS — Z87.59 HISTORY OF POSTPARTUM DEPRESSION: ICD-10-CM

## 2022-02-01 DIAGNOSIS — O09.91 HIGH-RISK PREGNANCY, FIRST TRIMESTER: Primary | ICD-10-CM

## 2022-02-01 DIAGNOSIS — Z3A.12 12 WEEKS GESTATION OF PREGNANCY: ICD-10-CM

## 2022-02-01 DIAGNOSIS — Z86.59 HISTORY OF POSTPARTUM DEPRESSION: ICD-10-CM

## 2022-02-01 LAB
ABO/RH: NORMAL
ANTIBODY SCREEN: NORMAL
PAP SMEAR, EXTERNAL: NEGATIVE

## 2022-02-01 PROCEDURE — 88142 CYTOPATH C/V THIN LAYER: CPT

## 2022-02-01 PROCEDURE — 0502F SUBSEQUENT PRENATAL CARE: CPT

## 2022-02-01 PROCEDURE — 87801 DETECT AGNT MULT DNA AMPLI: CPT

## 2022-02-01 PROCEDURE — 87624 HPV HI-RISK TYP POOLED RSLT: CPT

## 2022-02-01 ASSESSMENT — ENCOUNTER SYMPTOMS
RESPIRATORY NEGATIVE: 1
NAUSEA: 1
ABDOMINAL PAIN: 0

## 2022-02-01 NOTE — PROGRESS NOTES
22    Patrick Chaparro  1985    Chief Complaint   Patient presents with    Initial Prenatal Visit     pt here for NOB, labs to be drawn, pap to be done,last pap 2019, gc/chl done 21. taking pnv. pt c/o abdominal cramping and constipation. Patrick Chaparro is a 39 y.o. female,  ,  LMP was Patient's last menstrual period was 2021 (approximate). , who presents for presents for prenatal care. A urine test was completed. Since her LMP she has experienced nausea. Pt states symptoms resolved yesterday, manageable at this time. Pt also reports intermittent sharp pelvic pains that quickly come and go. Past History:  Her past pregnancies have been uncomplicated. Past Medical History:   Diagnosis Date    Anxiety     Depression     Pregnant     Seasonal allergic rhinitis 2017       Past Surgical History:   Procedure Laterality Date    WISDOM TOOTH EXTRACTION         Social History     Socioeconomic History    Marital status:      Spouse name: Not on file    Number of children: Not on file    Years of education: Not on file    Highest education level: Not on file   Occupational History    Not on file   Tobacco Use    Smoking status: Never Smoker    Smokeless tobacco: Never Used   Vaping Use    Vaping Use: Never used   Substance and Sexual Activity    Alcohol use: Not Currently     Comment: occasional - couple a week    Drug use: No    Sexual activity: Not Currently   Other Topics Concern    Not on file   Social History Narrative    Not on file     Social Determinants of Health     Financial Resource Strain: Low Risk     Difficulty of Paying Living Expenses: Not hard at all   Food Insecurity: No Food Insecurity    Worried About [address] in the Last Year: Never true    [address] in the Last Year: Never true   Transportation Needs:     Lack of Transportation (Medical):  Not on file    Lack of Transportation (Non-Medical): Not on file   Physical Activity:     Days of Exercise per Week: Not on file    Minutes of Exercise per Session: Not on file   Stress:     Feeling of Stress : Not on file   Social Connections:     Frequency of Communication with Friends and Family: Not on file    Frequency of Social Gatherings with Friends and Family: Not on file    Attends Yarsanism Services: Not on file    Active Member of [address] [place] [address] [employer] or Organizations: Not on file    Attends Club or Organization Meetings: Not on file    Marital Status: Not on file   Intimate Partner Violence:     Fear of Current or Ex-Partner: Not on file    Emotionally Abused: Not on file    Physically Abused: Not on file    Sexually Abused: Not on file   Housing Stability:     Unable to Pay for Housing in the Last Year: Not on file    Number of [place] in the Last Year: Not on file    Unstable Housing in the Last Year: Not on file       Family History   Problem Relation Age of Onset    Cancer Maternal Aunt     Heart Disease Paternal Grandfather     Heart Disease Father        Current Outpatient Medications   Medication Sig Dispense Refill    Prenatal-FeFum-FA-DHA w/o A (PRENATAL + DHA) 27-1 & 250 MG THPK Take 1 tablet by mouth daily 90 each 3    buPROPion (WELLBUTRIN XL) 150 MG extended release tablet TAKE 1 TABLET BY MOUTH EVERY DAY IN THE MORNING (Patient not taking: Reported on 2/1/2022) 90 tablet 1    dicyclomine (BENTYL) 10 MG capsule Take 1 capsule by mouth 4 times daily (Patient not taking: Reported on 2/1/2022) 120 capsule 3    bisacodyl (BISACODYL) 5 MG EC tablet Take 4 tablets once for colonoscopy prep (Patient not taking: Reported on 2/1/2022) 4 tablet 0    Fexofenadine-Pseudoephedrine (ALLEGRA-D PO) Take by mouth (Patient not taking: Reported on 12/20/2021)      acetaminophen (TYLENOL) 500 MG tablet Take 500 mg by mouth every 6 hours as needed for Pain (Patient not taking: Reported on 2/1/2022)       No current facility-administered medications for this visit. No Known Allergies        Immunization History   Administered Date(s) Administered    MMR 2012       Review of Systems   Constitutional: Negative. Negative for fatigue and fever. Respiratory: Negative. Gastrointestinal: Positive for nausea. Negative for abdominal pain. Endocrine: Negative. Genitourinary: Positive for menstrual problem and pelvic pain. Negative for vaginal pain. Musculoskeletal: Negative. Skin: Negative. Neurological: Negative. Negative for dizziness and headaches. Psychiatric/Behavioral: Negative. BP (!) 147/83   Wt 174 lb (78.9 kg)   LMP 2021 (Approximate)   BMI 26.46 kg/m²     Physical Exam  Vitals and nursing note reviewed. Constitutional:       General: She is not in acute distress. Appearance: Normal appearance. She is normal weight. HENT:      Head: Normocephalic and atraumatic. Pulmonary:      Effort: Pulmonary effort is normal. No respiratory distress. Abdominal:      Palpations: Abdomen is soft. Tenderness: There is no abdominal tenderness. Genitourinary:     General: Normal vulva. Exam position: Lithotomy position. Vagina: Normal.      Cervix: Normal.      Uterus: Normal.       Adnexa: Right adnexa normal and left adnexa normal.   Musculoskeletal:         General: Normal range of motion. Cervical back: Normal range of motion. Skin:     General: Skin is warm and dry. Neurological:      General: No focal deficit present. Mental Status: She is alert and oriented to person, place, and time. Psychiatric:         Mood and Affect: Mood normal.         Speech: Speech normal.         Behavior: Behavior normal.         Thought Content: Thought content normal.       FHR + by doppler    No results found for this visit on 22. ASSESSMENT AND PLAN   Diagnosis Orders   1.  High-risk pregnancy, first trimester  Obstetric panel    HIV Screen    Hepatitis C RNA QNT W Genotype RFLX Culture, Urine    PAP SMEAR    C.trachomatis N.gonorrhoeae DNA, Thin Prep    US OB DETAIL FETAL ANATOMY SINGLE OR FIRST GESTATION   2. 12 weeks gestation of pregnancy     3. Multigravida of advanced maternal age in first trimester     4. History of postpartum depression       Pap today, g&c pap, prenatal labs. Next prenatal 4 weeks, u/s 9 weeks    Materni 21& carrier screens collected, flu vaccine declined    General pregnancy info reviewed, all questions and concerns addressed. Bleeding and pain precautions reviewed as well.     Return in about 4 weeks (around 3/1/2022) for routine prenatal.    Jesus Philip PA-C

## 2022-02-02 LAB
BASOPHILS ABSOLUTE: 0 K/UL (ref 0–0.2)
BASOPHILS RELATIVE PERCENT: 0.4 %
EOSINOPHILS ABSOLUTE: 0.2 K/UL (ref 0–0.6)
EOSINOPHILS RELATIVE PERCENT: 1.5 %
HCT VFR BLD CALC: 38.6 % (ref 36–48)
HEMOGLOBIN: 13.1 G/DL (ref 12–16)
HEPATITIS B SURFACE ANTIGEN INTERPRETATION: ABNORMAL
HIV AG/AB: NORMAL
HIV ANTIGEN: NORMAL
HIV-1 ANTIBODY: NORMAL
HIV-2 AB: NORMAL
LYMPHOCYTES ABSOLUTE: 1.1 K/UL (ref 1–5.1)
LYMPHOCYTES RELATIVE PERCENT: 10.7 %
MCH RBC QN AUTO: 30.6 PG (ref 26–34)
MCHC RBC AUTO-ENTMCNC: 33.9 G/DL (ref 31–36)
MCV RBC AUTO: 90.2 FL (ref 80–100)
MONOCYTES ABSOLUTE: 0.7 K/UL (ref 0–1.3)
MONOCYTES RELATIVE PERCENT: 7.2 %
NEUTROPHILS ABSOLUTE: 8.3 K/UL (ref 1.7–7.7)
NEUTROPHILS RELATIVE PERCENT: 80.2 %
PDW BLD-RTO: 13.9 % (ref 12.4–15.4)
PLATELET # BLD: 219 K/UL (ref 135–450)
PMV BLD AUTO: 9.9 FL (ref 5–10.5)
RBC # BLD: 4.28 M/UL (ref 4–5.2)
RPR: ABNORMAL
RUBELLA ANTIBODY IGG: 37.5 IU/ML
URINE CULTURE, ROUTINE: NORMAL
WBC # BLD: 10.4 K/UL (ref 4–11)

## 2022-02-04 ENCOUNTER — TELEPHONE (OUTPATIENT)
Dept: OBGYN | Age: 37
End: 2022-02-04

## 2022-02-04 LAB
HCV QNT BY NAAT IU/ML: NOT DETECTED IU/ML
HCV QNT BY NAAT LOG IU/ML: NOT DETECTED LOG IU/ML
INTERPRETATION: NOT DETECTED

## 2022-02-04 RX ORDER — AMOXICILLIN 500 MG/1
500 CAPSULE ORAL 4 TIMES DAILY
Qty: 28 CAPSULE | Refills: 0 | Status: SHIPPED | OUTPATIENT
Start: 2022-02-04 | End: 2022-02-11

## 2022-02-04 NOTE — TELEPHONE ENCOUNTER
Pt is 12+ weeks preg edc 8/17/22. pts daughter just tested negative for covid but positive for strep throat. Pt is now c/o severe sore throat and runny nose. Pt req antibiotic. Per Dr. Aparna Sales call in amoxicillin 500mg 1poq 6hrs x 7 days.

## 2022-02-08 LAB
CHLAMYDIA BY THIN PREP: NEGATIVE
GC BY THIN PREP: NEGATIVE

## 2022-02-09 LAB
HPV HIGH RISK: NOT DETECTED
HPV, GENOTYPE 16: NOT DETECTED
HPV, GENOTYPE 18: NOT DETECTED

## 2022-02-21 ENCOUNTER — ROUTINE PRENATAL (OUTPATIENT)
Dept: OBGYN | Age: 37
End: 2022-02-21

## 2022-02-21 VITALS — BODY MASS INDEX: 26.61 KG/M2 | WEIGHT: 175 LBS | SYSTOLIC BLOOD PRESSURE: 141 MMHG | DIASTOLIC BLOOD PRESSURE: 108 MMHG

## 2022-02-21 DIAGNOSIS — Z86.59 HISTORY OF POSTPARTUM DEPRESSION: Primary | ICD-10-CM

## 2022-02-21 DIAGNOSIS — Z3A.15 15 WEEKS GESTATION OF PREGNANCY: ICD-10-CM

## 2022-02-21 DIAGNOSIS — Z87.59 HISTORY OF POSTPARTUM DEPRESSION: Primary | ICD-10-CM

## 2022-02-21 DIAGNOSIS — O09.92 HIGH RISK FOR INTRAPARTUM COMPLICATIONS IN SECOND TRIMESTER: ICD-10-CM

## 2022-02-21 PROCEDURE — 0502F SUBSEQUENT PRENATAL CARE: CPT | Performed by: OBSTETRICS & GYNECOLOGY

## 2022-02-21 NOTE — PROGRESS NOTES
Return OB Office Visit    CC:   Chief Complaint   Patient presents with    Routine Prenatal Visit     pt c/o yeast infection, day 5 on over the counter applicator pt states she has not noticed much improvement w/ discharge, pt states shes had some spotting , pt denies any pain or urgency w/ with urination. HPI:  Patient seen and examined. No concerns/complaints. Denies VB, LOF, ctx. +Fm. Denies headaches, vision changes, RUQ pain, increased LE edema. Denies chest pain, shortness of breath, fever, chills, nausea, vomiting. Review of Systems: The following ROS was otherwise negative, except as noted in the HPI: constitutional, HEENT, respiratory, cardiovascular, gastrointestinal, genitourinary, skin, musculoskeletal, neurological, psych    Objective:  BP (!) 141/108   Wt 175 lb (79.4 kg)   LMP 2021 (Approximate)   BMI 26.61 kg/m²     Gravid, non tender, no flank pain    FHR: Positive by doppler    Assessment/Plan:   Sami Franklin is a 39 y.o.  at 15w4d who presents for routine OB visit     Diagnosis Orders   1. History of postpartum depression     2. High risk for intrapartum complications in second trimester  Vaginal Pathogens Probes *A   3. 15 weeks gestation of pregnancy       Orders Placed This Encounter   Procedures    Vaginal Pathogens Probes *A         Return in about 4 weeks (around 3/21/2022) for follow up appointment.       Song Cutler MD

## 2022-02-22 LAB
CANDIDA SPECIES, DNA PROBE: NORMAL
GARDNERELLA VAGINALIS, DNA PROBE: NORMAL
TRICHOMONAS VAGINALIS DNA: NORMAL

## 2022-03-01 ENCOUNTER — ROUTINE PRENATAL (OUTPATIENT)
Dept: OBGYN | Age: 37
End: 2022-03-01

## 2022-03-01 VITALS — WEIGHT: 178 LBS | BODY MASS INDEX: 27.06 KG/M2 | DIASTOLIC BLOOD PRESSURE: 84 MMHG | SYSTOLIC BLOOD PRESSURE: 135 MMHG

## 2022-03-01 DIAGNOSIS — Z3A.16 16 WEEKS GESTATION OF PREGNANCY: ICD-10-CM

## 2022-03-01 DIAGNOSIS — O26.893 ABDOMINAL PAIN DURING PREGNANCY, THIRD TRIMESTER: ICD-10-CM

## 2022-03-01 DIAGNOSIS — O26.892 SHORTNESS OF BREATH DUE TO PREGNANCY IN SECOND TRIMESTER: ICD-10-CM

## 2022-03-01 DIAGNOSIS — O20.0 THREATENED ABORTION: ICD-10-CM

## 2022-03-01 DIAGNOSIS — O09.92 SUPERVISION OF HIGH RISK PREGNANCY IN SECOND TRIMESTER: Primary | ICD-10-CM

## 2022-03-01 DIAGNOSIS — R06.02 SHORTNESS OF BREATH DUE TO PREGNANCY IN SECOND TRIMESTER: ICD-10-CM

## 2022-03-01 DIAGNOSIS — R10.9 ABDOMINAL PAIN DURING PREGNANCY, THIRD TRIMESTER: ICD-10-CM

## 2022-03-01 LAB
BACTERIA: ABNORMAL /HPF
BILIRUBIN URINE: NEGATIVE
BLOOD, URINE: NEGATIVE
CLARITY: CLEAR
COLOR: YELLOW
EPITHELIAL CELLS, UA: 3 /HPF (ref 0–5)
GLUCOSE URINE: NEGATIVE MG/DL
HYALINE CASTS: 1 /LPF (ref 0–8)
KETONES, URINE: NEGATIVE MG/DL
LEUKOCYTE ESTERASE, URINE: ABNORMAL
MICROSCOPIC EXAMINATION: YES
NITRITE, URINE: NEGATIVE
PH UA: 7 (ref 5–8)
PROTEIN UA: NEGATIVE MG/DL
RBC UA: 0 /HPF (ref 0–4)
SPECIFIC GRAVITY UA: 1.01 (ref 1–1.03)
URINE TYPE: ABNORMAL
UROBILINOGEN, URINE: 0.2 E.U./DL
WBC UA: 2 /HPF (ref 0–5)

## 2022-03-01 PROCEDURE — 0502F SUBSEQUENT PRENATAL CARE: CPT | Performed by: OBSTETRICS & GYNECOLOGY

## 2022-03-01 SDOH — ECONOMIC STABILITY: FOOD INSECURITY: WITHIN THE PAST 12 MONTHS, THE FOOD YOU BOUGHT JUST DIDN'T LAST AND YOU DIDN'T HAVE MONEY TO GET MORE.: NEVER TRUE

## 2022-03-01 SDOH — ECONOMIC STABILITY: TRANSPORTATION INSECURITY
IN THE PAST 12 MONTHS, HAS THE LACK OF TRANSPORTATION KEPT YOU FROM MEDICAL APPOINTMENTS OR FROM GETTING MEDICATIONS?: NO

## 2022-03-01 SDOH — ECONOMIC STABILITY: TRANSPORTATION INSECURITY
IN THE PAST 12 MONTHS, HAS LACK OF TRANSPORTATION KEPT YOU FROM MEETINGS, WORK, OR FROM GETTING THINGS NEEDED FOR DAILY LIVING?: NO

## 2022-03-01 SDOH — ECONOMIC STABILITY: INCOME INSECURITY: IN THE LAST 12 MONTHS, WAS THERE A TIME WHEN YOU WERE NOT ABLE TO PAY THE MORTGAGE OR RENT ON TIME?: NO

## 2022-03-01 SDOH — ECONOMIC STABILITY: FOOD INSECURITY: WITHIN THE PAST 12 MONTHS, YOU WORRIED THAT YOUR FOOD WOULD RUN OUT BEFORE YOU GOT MONEY TO BUY MORE.: NEVER TRUE

## 2022-03-01 SDOH — ECONOMIC STABILITY: HOUSING INSECURITY
IN THE LAST 12 MONTHS, WAS THERE A TIME WHEN YOU DID NOT HAVE A STEADY PLACE TO SLEEP OR SLEPT IN A SHELTER (INCLUDING NOW)?: NO

## 2022-03-01 SDOH — ECONOMIC STABILITY: INCOME INSECURITY: HOW HARD IS IT FOR YOU TO PAY FOR THE VERY BASICS LIKE FOOD, HOUSING, MEDICAL CARE, AND HEATING?: NOT HARD AT ALL

## 2022-03-01 NOTE — PROGRESS NOTES
Return OB Office Visit    CC:   Chief Complaint   Patient presents with    Routine Prenatal Visit     pt c/o cramping, constipation and s.o.b. ed       As above  Also concerned with vaginal bleeding last week  HPI:  Patient seen and examined. No concerns/complaints. Denies VBsince last week, LOF, ctx. +Fm. Denies headaches, vision changes, RUQ pain, increased LE edema. Denies chest pain, shortness of breath, fever, chills, nausea, vomiting. Review of Systems: The following ROS was otherwise negative, except as noted in the HPI: constitutional, HEENT, respiratory, cardiovascular, gastrointestinal, genitourinary, skin, musculoskeletal, neurological, psych    Objective:  /84   Wt 178 lb (80.7 kg)   LMP 2021 (Approximate)   BMI 27.06 kg/m²     Gravid, non tender, no flank pain    FHR: +by doppler  Abdomen benign    so2 at work 98%    Assessment/Plan:   Zac Levine is a 39 y.o.  at 16w5d who presents for routine OB visit     Diagnosis Orders   1. Supervision of high risk pregnancy in second trimester  US OB 14 PLUS WEEKS SINGLE OR FIRST GESTATION   2. 16 weeks gestation of pregnancy     3. Threatened   US OB 14 PLUS WEEKS SINGLE OR FIRST GESTATION   4. Abdominal pain during pregnancy, third trimester  Urinalysis with Microscopic    Culture, Urine    US OB 14 PLUS WEEKS SINGLE OR FIRST GESTATION     benefiber for constipation  Discussed ct chest due to SOB, declines, will monitor    No follow-ups on file.       Gustabo Saldana MD

## 2022-03-02 LAB — URINE CULTURE, ROUTINE: NORMAL

## 2022-03-29 ENCOUNTER — ROUTINE PRENATAL (OUTPATIENT)
Dept: OBGYN | Age: 37
End: 2022-03-29

## 2022-03-29 VITALS — SYSTOLIC BLOOD PRESSURE: 131 MMHG | DIASTOLIC BLOOD PRESSURE: 80 MMHG | WEIGHT: 181 LBS | BODY MASS INDEX: 27.52 KG/M2

## 2022-03-29 DIAGNOSIS — O09.522 AMA (ADVANCED MATERNAL AGE) MULTIGRAVIDA 35+, SECOND TRIMESTER: ICD-10-CM

## 2022-03-29 DIAGNOSIS — Z34.82 NORMAL PREGNANCY IN MULTIGRAVIDA IN SECOND TRIMESTER: Primary | ICD-10-CM

## 2022-03-29 DIAGNOSIS — Z3A.20 20 WEEKS GESTATION OF PREGNANCY: ICD-10-CM

## 2022-03-29 PROCEDURE — 0502F SUBSEQUENT PRENATAL CARE: CPT | Performed by: NURSE PRACTITIONER

## 2022-03-29 NOTE — PROGRESS NOTES
Return OB Office Visit    CC:   Chief Complaint   Patient presents with    Routine Prenatal Visit     ultrasound today, no c/o. HPI:  Patient seen and examined. No concerns/complaints. None. Denies VB, LOF, ctx. +Fm. Denies headaches, vision changes, RUQ pain, increased LE edema. Denies chest pain, shortness of breath, fever, chills, nausea, vomiting. Review of Systems: The following ROS was otherwise negative, except as noted in the HPI: constitutional, HEENT, respiratory, cardiovascular, gastrointestinal, genitourinary, skin, musculoskeletal, neurological, psych    Objective:  /80   Wt 181 lb (82.1 kg)   LMP 2021 (Approximate)   BMI 27.52 kg/m²     Physical Exam  Vitals and nursing note reviewed. Constitutional:       Appearance: Normal appearance. HENT:      Head: Normocephalic. Pulmonary:      Effort: Pulmonary effort is normal.   Musculoskeletal:         General: Normal range of motion. Cervical back: Normal range of motion. Skin:     General: Skin is warm and dry. Neurological:      Mental Status: She is alert. Psychiatric:         Mood and Affect: Mood normal.         Gravid, non tender, no flank pain    FHR: + by doppler    Assessment/Plan:   Yael Garcia is a 39 y.o.  at 20w5d who presents for routine OB visit     Diagnosis Orders   1. Normal pregnancy in multigravida in second trimester     2. 20 weeks gestation of pregnancy     3. AMA (advanced maternal age) multigravida 33+, second trimester         PTL s/s and FM patterns reviewed, report bldg/lof  U/s reviewed. Return in about 4 weeks (around 2022).     Vinay Dhaliwal, APRN - CNP

## 2022-04-26 ENCOUNTER — ROUTINE PRENATAL (OUTPATIENT)
Dept: OBGYN | Age: 37
End: 2022-04-26

## 2022-04-26 VITALS — WEIGHT: 186 LBS | DIASTOLIC BLOOD PRESSURE: 72 MMHG | SYSTOLIC BLOOD PRESSURE: 111 MMHG | BODY MASS INDEX: 28.28 KG/M2

## 2022-04-26 DIAGNOSIS — O09.92 SUPERVISION OF HIGH RISK PREGNANCY IN SECOND TRIMESTER: ICD-10-CM

## 2022-04-26 DIAGNOSIS — O09.522 ELDERLY MULTIGRAVIDA IN SECOND TRIMESTER: Primary | ICD-10-CM

## 2022-04-26 DIAGNOSIS — Z3A.24 24 WEEKS GESTATION OF PREGNANCY: ICD-10-CM

## 2022-04-26 PROCEDURE — 0502F SUBSEQUENT PRENATAL CARE: CPT | Performed by: OBSTETRICS & GYNECOLOGY

## 2022-04-26 NOTE — PROGRESS NOTES
Return OB Office Visit    CC:   Chief Complaint   Patient presents with    Routine Prenatal Visit     no c/o. ed       HPI:  Patient seen and examined. No concerns/complaints. Denies VB, LOF, ctx. +Fm. Denies headaches, vision changes, RUQ pain, increased LE edema. Denies chest pain, shortness of breath, fever, chills, nausea, vomiting. Review of Systems: The following ROS was otherwise negative, except as noted in the HPI: constitutional, HEENT, respiratory, cardiovascular, gastrointestinal, genitourinary, skin, musculoskeletal, neurological, psych    Objective:  /72   Wt 186 lb (84.4 kg)   LMP 2021 (Approximate)   BMI 28.28 kg/m²     Gravid, non tender, no flank pain    FHR: +by doppler    Assessment/Plan:   Sirena Gee is a 39 y.o.  at 24w5d who presents for routine OB visit     Diagnosis Orders   1. Elderly multigravida in second trimester     2. 24 weeks gestation of pregnancy     3. Supervision of high risk pregnancy in second trimester       ptl precautions, fkcs    Return in about 4 weeks (around 2022).       Chana Ferraro MD

## 2022-05-19 ENCOUNTER — HOSPITAL ENCOUNTER (OUTPATIENT)
Age: 37
Discharge: HOME | End: 2022-05-19
Attending: OBSTETRICS & GYNECOLOGY | Admitting: OBSTETRICS & GYNECOLOGY
Payer: COMMERCIAL

## 2022-05-19 ENCOUNTER — TELEPHONE (OUTPATIENT)
Dept: OBGYN | Age: 37
End: 2022-05-19

## 2022-05-19 VITALS
SYSTOLIC BLOOD PRESSURE: 122 MMHG | WEIGHT: 186 LBS | HEART RATE: 75 BPM | TEMPERATURE: 97 F | BODY MASS INDEX: 28.19 KG/M2 | DIASTOLIC BLOOD PRESSURE: 81 MMHG | RESPIRATION RATE: 16 BRPM | OXYGEN SATURATION: 100 % | HEIGHT: 68 IN

## 2022-05-19 PROBLEM — Z34.90 SUPERVISION OF NORMAL PREGNANCY: Status: ACTIVE | Noted: 2022-05-19

## 2022-05-19 LAB
AMPHETAMINES: NEGATIVE
BACTERIA: NEGATIVE /HPF
BARBITURATE SCREEN URINE: NEGATIVE
BASOPHILS ABSOLUTE: 0 K/CU MM
BASOPHILS RELATIVE PERCENT: 0.3 % (ref 0–1)
BENZODIAZEPINE SCREEN, URINE: NEGATIVE
BILIRUBIN URINE: NEGATIVE MG/DL
BLOOD, URINE: NEGATIVE
CANNABINOID SCREEN URINE: NEGATIVE
CLARITY: CLEAR
COCAINE METABOLITE: NEGATIVE
COLOR: COLORLESS
DIFFERENTIAL TYPE: ABNORMAL
EOSINOPHILS ABSOLUTE: 0.3 K/CU MM
EOSINOPHILS RELATIVE PERCENT: 2.1 % (ref 0–3)
GLUCOSE, URINE: NEGATIVE MG/DL
HCT VFR BLD CALC: 34.2 % (ref 37–47)
HEMOGLOBIN: 11 GM/DL (ref 12.5–16)
IMMATURE NEUTROPHIL %: 3.1 % (ref 0–0.43)
KETONES, URINE: NEGATIVE MG/DL
LEUKOCYTE ESTERASE, URINE: NEGATIVE
LYMPHOCYTES ABSOLUTE: 1.8 K/CU MM
LYMPHOCYTES RELATIVE PERCENT: 12.9 % (ref 24–44)
MCH RBC QN AUTO: 31 PG (ref 27–31)
MCHC RBC AUTO-ENTMCNC: 32.2 % (ref 32–36)
MCV RBC AUTO: 96.3 FL (ref 78–100)
MONOCYTES ABSOLUTE: 1 K/CU MM
MONOCYTES RELATIVE PERCENT: 7 % (ref 0–4)
MUCUS: ABNORMAL HPF
NITRITE URINE, QUANTITATIVE: NEGATIVE
NUCLEATED RBC %: 0 %
OPIATES, URINE: NEGATIVE
OXYCODONE: NEGATIVE
PDW BLD-RTO: 13.2 % (ref 11.7–14.9)
PH, URINE: 7 (ref 5–8)
PHENCYCLIDINE, URINE: NEGATIVE
PLATELET # BLD: 220 K/CU MM (ref 140–440)
PMV BLD AUTO: 11.5 FL (ref 7.5–11.1)
PROTEIN UA: NEGATIVE MG/DL
RBC # BLD: 3.55 M/CU MM (ref 4.2–5.4)
RBC URINE: ABNORMAL /HPF (ref 0–6)
SEGMENTED NEUTROPHILS ABSOLUTE COUNT: 10.5 K/CU MM
SEGMENTED NEUTROPHILS RELATIVE PERCENT: 74.6 % (ref 36–66)
SPECIFIC GRAVITY UA: 1.01 (ref 1–1.03)
SQUAMOUS EPITHELIAL: 1 /HPF
TOTAL IMMATURE NEUTOROPHIL: 0.44 K/CU MM
TOTAL NUCLEATED RBC: 0 K/CU MM
TRICHOMONAS: ABNORMAL /HPF
UROBILINOGEN, URINE: NORMAL MG/DL (ref 0.2–1)
WBC # BLD: 14 K/CU MM (ref 4–10.5)
WBC UA: ABNORMAL /HPF (ref 0–5)

## 2022-05-19 PROCEDURE — 85025 COMPLETE CBC W/AUTO DIFF WBC: CPT

## 2022-05-19 PROCEDURE — 99212 OFFICE O/P EST SF 10 MIN: CPT

## 2022-05-19 PROCEDURE — 81001 URINALYSIS AUTO W/SCOPE: CPT

## 2022-05-19 PROCEDURE — 80307 DRUG TEST PRSMV CHEM ANLYZR: CPT

## 2022-05-19 RX ORDER — ACETAMINOPHEN 325 MG/1
650 TABLET ORAL EVERY 4 HOURS PRN
Status: DISCONTINUED | OUTPATIENT
Start: 2022-05-19 | End: 2022-05-20 | Stop reason: HOSPADM

## 2022-05-20 NOTE — FLOWSHEET NOTE
Dr. Kriss Yu @ nurses' station. Labs and FMS reviewed. Orders received for discharge. Dr. Kriss Yu stated she will discuss POC with patient. RN voiced understanding.

## 2022-05-20 NOTE — PROGRESS NOTES
Department of Obstetrics and Gynecology  Labor and Delivery  TRIAGE NOTE      SUBJECTIVE:    Chief Complaint   Patient presents with    Dizziness     few contractions     Pt is a 40 yr old  at 29 weeks. She reports feeling hot and dizzy and \"a little off\" today. Denies cough, chest pain, sob, or any urinary symptoms.     OBJECTIVE    Vitals:  Vitals:    22   BP:    Pulse:    Resp:    Temp:    SpO2: 97%       CONSTITUTIONAL:  awake, alert, cooperative, no apparent distress, and appears stated age  ABDOMEN:  No scars, normal bowel sounds, soft, non-distended, non-tender, no masses palpated, no hepatosplenomegally  Ext : no calf tenderness    Membranes:    Intact    Fetal heart rate:        Baseline FHR :        Category 1 tracing          Contraction frequency:  0 minutes/irreg    DATA:  Urinalysis [[id number]] (Abnormal)    Collected: 22    Updated: 22    Specimen Source: Urine voided     Color, UA COLORLESS Abnormal     Clarity, UA CLEAR    Glucose, Urine NEGATIVE MG/DL    Bilirubin Urine NEGATIVE MG/DL    Ketones, Urine NEGATIVE MG/DL    Specific Gravity, UA 1.010    Blood, Urine NEGATIVE    pH, Urine 7.0    Protein, UA NEGATIVE MG/DL    Urobilinogen, Urine NORMAL MG/DL    Nitrite Urine, Quantitative NEGATIVE    Leukocyte Esterase, Urine NEGATIVE    RBC, UA NONE SEEN /HPF    WBC, UA NONE SEEN /HPF    Bacteria, UA NEGATIVE /HPF    Squam Epithel, UA 1 /HPF    Mucus, UA RARE Abnormal  HPF    Trichomonas, UA NONE SEEN /HPF   DRUG SCREEN MULTI URINE [[id number]]    Collected: 22    Updated: 22    Specimen Source: Urine voided     Cannabinoid Scrn, Ur NEGATIVE    Amphetamines NEGATIVE    Cocaine Metabolite NEGATIVE    Benzodiazepine Screen, Urine NEGATIVE    Barbiturate Screen, Ur NEGATIVE    Opiates, Urine NEGATIVE    Phencyclidine, Urine NEGATIVE    Oxycodone NEGATIVE    Comment:          THRESHOLD CONCENTRATIONS (mg/dL)   AMPHT               1000   MATTIE,OPIA             300   BZO,BAR              200   PCP                   25   THC                   50   OXY                  100           * UNCONFIRMED POSITIVES MAY   NOT MEET FORENSIC REQUIREMENTS. CBC with Auto Differential [[id number]] (Abnormal)    Collected: 05/19/22 1930    Updated: 05/19/22 2033    Specimen Source: Blood     WBC 14.0 High  K/CU MM    RBC 3.55 Low  M/CU MM    Hemoglobin 11.0 Low  GM/DL    Hematocrit 34.2 Low  %    MCV 96.3 FL    MCH 31.0 PG    MCHC 32.2 %    RDW 13.2 %    Platelets 119 K/CU MM    MPV 11.5 High  FL    Differential Type AUTOMATED DIFFERENTIAL    Segs Relative 74.6 High  %    Lymphocytes % 12.9 Low  %    Monocytes % 7.0 High  %    Eosinophils % 2.1 %    Basophils % 0.3 %    Segs Absolute 10.5 K/CU MM    Lymphocytes Absolute 1.8 K/CU MM    Monocytes Absolute 1.0 K/CU MM    Eosinophils Absolute 0.3 K/CU MM    Basophils Absolute 0.0 K/CU MM    Nucleated RBC % 0.0 %    Total Nucleated RBC 0.0 K/CU MM    Total Immature Neutrophil 0.44 K/CU MM    Immature Neutrophil % 3.1 High           ASSESSMENT:     28 weeks gestation  Elevated WBC count, of unclear origin      PLAN:  Encouraged rest, hydration and f/u as outpt. Call with any cough, sob, n/v/d or fever. Keep next appt.

## 2022-05-20 NOTE — FLOWSHEET NOTE
Discharge instructions reviewed with patient. Patient voiced understanding. Monitors removed and instructed to change out of gown @ 0455.

## 2022-05-24 ENCOUNTER — ROUTINE PRENATAL (OUTPATIENT)
Dept: OBGYN | Age: 37
End: 2022-05-24
Payer: COMMERCIAL

## 2022-05-24 VITALS — DIASTOLIC BLOOD PRESSURE: 80 MMHG | BODY MASS INDEX: 28.74 KG/M2 | SYSTOLIC BLOOD PRESSURE: 126 MMHG | WEIGHT: 189 LBS

## 2022-05-24 DIAGNOSIS — O09.523 AMA (ADVANCED MATERNAL AGE) MULTIGRAVIDA 35+, THIRD TRIMESTER: ICD-10-CM

## 2022-05-24 DIAGNOSIS — Z3A.28 28 WEEKS GESTATION OF PREGNANCY: ICD-10-CM

## 2022-05-24 DIAGNOSIS — Z34.82 PRENATAL CARE, SUBSEQUENT PREGNANCY, SECOND TRIMESTER: ICD-10-CM

## 2022-05-24 DIAGNOSIS — Z34.83 PRENATAL CARE, SUBSEQUENT PREGNANCY, THIRD TRIMESTER: ICD-10-CM

## 2022-05-24 DIAGNOSIS — O09.93 SUPERVISION OF HIGH RISK PREGNANCY IN THIRD TRIMESTER: Primary | ICD-10-CM

## 2022-05-24 LAB
HCT VFR BLD CALC: 35.5 % (ref 36–48)
HEMOGLOBIN: 11.9 G/DL (ref 12–16)
MCH RBC QN AUTO: 31.1 PG (ref 26–34)
MCHC RBC AUTO-ENTMCNC: 33.6 G/DL (ref 31–36)
MCV RBC AUTO: 92.5 FL (ref 80–100)
PDW BLD-RTO: 13.9 % (ref 12.4–15.4)
PLATELET # BLD: 224 K/UL (ref 135–450)
PMV BLD AUTO: 10 FL (ref 5–10.5)
RBC # BLD: 3.84 M/UL (ref 4–5.2)
WBC # BLD: 11.9 K/UL (ref 4–11)

## 2022-05-24 PROCEDURE — 0502F SUBSEQUENT PRENATAL CARE: CPT | Performed by: NURSE PRACTITIONER

## 2022-05-24 PROCEDURE — 36415 COLL VENOUS BLD VENIPUNCTURE: CPT | Performed by: NURSE PRACTITIONER

## 2022-05-25 LAB
GLUCOSE CHALLENGE: 91 MG/DL
TOTAL SYPHILLIS IGG/IGM: NORMAL

## 2022-06-02 ENCOUNTER — ROUTINE PRENATAL (OUTPATIENT)
Dept: OBGYN | Age: 37
End: 2022-06-02

## 2022-06-02 VITALS — WEIGHT: 192 LBS | SYSTOLIC BLOOD PRESSURE: 134 MMHG | DIASTOLIC BLOOD PRESSURE: 87 MMHG | BODY MASS INDEX: 29.19 KG/M2

## 2022-06-02 DIAGNOSIS — Z3A.30 30 WEEKS GESTATION OF PREGNANCY: ICD-10-CM

## 2022-06-02 DIAGNOSIS — O36.8131 DECREASED FETAL MOVEMENTS IN THIRD TRIMESTER, FETUS 1 OF MULTIPLE GESTATION: ICD-10-CM

## 2022-06-02 DIAGNOSIS — O09.523 AMA (ADVANCED MATERNAL AGE) MULTIGRAVIDA 35+, THIRD TRIMESTER: ICD-10-CM

## 2022-06-02 DIAGNOSIS — O09.93 SUPERVISION OF HIGH RISK PREGNANCY IN THIRD TRIMESTER: Primary | ICD-10-CM

## 2022-06-02 PROCEDURE — 0502F SUBSEQUENT PRENATAL CARE: CPT

## 2022-06-02 NOTE — PROGRESS NOTES
Return OB Office Visit    CC:   Chief Complaint   Patient presents with    Routine Prenatal Visit     Pt c/o decreased fetal movement this week, she states she does feel baby but just not as much and not as strong. pt also is flying on Monday and still wants to make sure that is ok. HPI:  Patient seen and examined. No concerns/complaints. Denies VB, LOF, ctx. Denies headaches, vision changes, RUQ pain, increased LE edema. Denies chest pain, shortness of breath, fever, chills, nausea, vomiting. Pt reports decreased fetal movement over the past week, has still felt movement every day but not as frequent as normal. She states she has not felt the baby move at all today. Review of Systems: The following ROS was otherwise negative, except as noted in the HPI: constitutional, HEENT, respiratory, cardiovascular, gastrointestinal, genitourinary, skin, musculoskeletal, neurological, psych    Objective:  /87   Wt 192 lb (87.1 kg)   LMP 2021 (Approximate)   BMI 29.19 kg/m²     Physical Exam  Vitals and nursing note reviewed. Constitutional:       General: She is not in acute distress. Appearance: Normal appearance. She is normal weight. HENT:      Head: Normocephalic and atraumatic. Pulmonary:      Effort: Pulmonary effort is normal. No respiratory distress. Musculoskeletal:         General: Normal range of motion. Cervical back: Normal range of motion. Skin:     General: Skin is warm and dry. Neurological:      General: No focal deficit present. Mental Status: She is alert and oriented to person, place, and time. Psychiatric:         Mood and Affect: Mood normal.         Speech: Speech normal.         Behavior: Behavior normal. Behavior is cooperative. Thought Content:  Thought content normal.         Gravid, non tender, no flank pain    FHR: + by doppler    Assessment/Plan:   Jose Jack is a 40 y.o.  at 30w0d who presents for routine OB visit     Diagnosis Orders   1. Supervision of high risk pregnancy in third trimester     2. 30 weeks gestation of pregnancy     3. AMA (advanced maternal age) multigravida 33+, third trimester     4. Decreased fetal movements in third trimester, fetus 1 of multiple gestation  IA FETAL NON-STRESS TEST     To birthing center for NST    Discussed frequent walking while traveling, signs/symptoms of clots. Pt states they also have a layover and she will be sure to stand and move often throughout travel. Next prenatal 2 weeks, all other questions/concerns answered.      Return in about 2 weeks (around 6/16/2022) for routine prenatal.    Nigel Peres PA-C

## 2022-06-16 ENCOUNTER — ROUTINE PRENATAL (OUTPATIENT)
Dept: OBGYN | Age: 37
End: 2022-06-16

## 2022-06-16 VITALS — SYSTOLIC BLOOD PRESSURE: 110 MMHG | BODY MASS INDEX: 29.5 KG/M2 | WEIGHT: 194 LBS | DIASTOLIC BLOOD PRESSURE: 71 MMHG

## 2022-06-16 DIAGNOSIS — O09.93 SUPERVISION OF HIGH RISK PREGNANCY IN THIRD TRIMESTER: ICD-10-CM

## 2022-06-16 DIAGNOSIS — Z3A.32 32 WEEKS GESTATION OF PREGNANCY: ICD-10-CM

## 2022-06-16 DIAGNOSIS — O09.523 ELDERLY MULTIGRAVIDA IN THIRD TRIMESTER: Primary | ICD-10-CM

## 2022-06-16 PROCEDURE — 0502F SUBSEQUENT PRENATAL CARE: CPT | Performed by: OBSTETRICS & GYNECOLOGY

## 2022-06-16 NOTE — PROGRESS NOTES
Return OB Office Visit    CC:   Chief Complaint   Patient presents with    Routine Prenatal Visit     no c/o . ns       HPI:  Patient seen and examined. No concerns/complaints. Denies VB, LOF, ctx. +Fm. Denies headaches, vision changes, RUQ pain, increased LE edema. Denies chest pain, shortness of breath, fever, chills, nausea, vomiting. Review of Systems: The following ROS was otherwise negative, except as noted in the HPI: constitutional, HEENT, respiratory, cardiovascular, gastrointestinal, genitourinary, skin, musculoskeletal, neurological, psych    Objective:  /71   Wt 194 lb (88 kg)   LMP 2021 (Approximate)   BMI 29.50 kg/m²     Gravid, non tender, no flank pain    FHR: +by doppler    Assessment/Plan:   Murphy Kingston is a 40 y.o.  at 32w0d who presents for routine OB visit     Diagnosis Orders   1. Elderly multigravida in third trimester  US PREG UTERUS FOLLOW UP TRANSABD PER FETUS   2. 32 weeks gestation of pregnancy     3. Supervision of high risk pregnancy in third trimester       Fkcs, ptl precautions    Return in about 2 weeks (around 2022).       Camryn Jacobsen MD

## 2022-06-23 ENCOUNTER — TELEPHONE (OUTPATIENT)
Dept: OBGYN | Age: 37
End: 2022-06-23

## 2022-06-23 NOTE — TELEPHONE ENCOUNTER
Generally speaking up to 120 is considered normal in pregnancy. If you are symptomatic with lightheadedness, dizziness, chest pain, then we would need to evaluate.

## 2022-06-29 ENCOUNTER — ROUTINE PRENATAL (OUTPATIENT)
Dept: OBGYN | Age: 37
End: 2022-06-29

## 2022-06-29 VITALS — SYSTOLIC BLOOD PRESSURE: 119 MMHG | DIASTOLIC BLOOD PRESSURE: 75 MMHG | BODY MASS INDEX: 29.95 KG/M2 | WEIGHT: 197 LBS

## 2022-06-29 DIAGNOSIS — O09.93 SUPERVISION OF HIGH RISK PREGNANCY IN THIRD TRIMESTER: Primary | ICD-10-CM

## 2022-06-29 DIAGNOSIS — O09.523 ELDERLY MULTIGRAVIDA IN THIRD TRIMESTER: ICD-10-CM

## 2022-06-29 DIAGNOSIS — Z3A.33 33 WEEKS GESTATION OF PREGNANCY: ICD-10-CM

## 2022-06-29 PROCEDURE — 0502F SUBSEQUENT PRENATAL CARE: CPT

## 2022-06-29 NOTE — PROGRESS NOTES
Return OB Office Visit    CC:   Chief Complaint   Patient presents with    Routine Prenatal Visit     pt has concerns about resting heart rate. u/s today. ed       HPI:  Patient seen and examined. No concerns/complaints. Denies VB, LOF, ctx. +Fm. Denies headaches, vision changes, RUQ pain, increased LE edema. Denies chest pain, shortness of breath, fever, chills, nausea, vomiting. Pt has been checking HR, states she has noted up to 117 sitting heart rate at times. States occasionally she feels a \"flutter\" in chest, as if heart is skipping a beat. Denies chest pain, vision changes, difficulty breathing, headache, extremity numbness/weakness. She states every time she checks her blood pressure, it is normal.     Review of Systems: The following ROS was otherwise negative, except as noted in the HPI: constitutional, HEENT, respiratory, cardiovascular, gastrointestinal, genitourinary, skin, musculoskeletal, neurological, psych    Objective:  /75   Wt 197 lb (89.4 kg)   LMP 2021 (Approximate)   BMI 29.95 kg/m²     Physical Exam  Vitals and nursing note reviewed. Constitutional:       General: She is not in acute distress. Appearance: Normal appearance. She is normal weight. HENT:      Head: Normocephalic and atraumatic. Pulmonary:      Effort: Pulmonary effort is normal. No respiratory distress. Musculoskeletal:         General: Normal range of motion. Cervical back: Normal range of motion. Skin:     General: Skin is warm and dry. Neurological:      General: No focal deficit present. Mental Status: She is alert and oriented to person, place, and time. Psychiatric:         Mood and Affect: Mood normal.         Speech: Speech normal.         Behavior: Behavior normal. Behavior is cooperative. Thought Content:  Thought content normal.         Gravid, non tender, no flank pain    FHR: + by doppler    Assessment/Plan:   Kang Caban is a 40 y.o.  at 33w6d who presents for routine OB visit     Diagnosis Orders   1. Supervision of high risk pregnancy in third trimester     2. 33 weeks gestation of pregnancy     3. Elderly multigravida in third trimester       U/s today    Discussed normal HR can go up to 120, increased blood volume in pregnancy possible contributor to symptoms. Pt educated on troublesome signs/symptoms and to hospital for chest pain, SOB, headache, vision changes, etc. Encouraged adequate hydration as well, particularly this time of year    Continue monitoring fetal movement, bleeding/pain//labor precautions, patient verbalizes understanding. No other concerns/complaints today.     Return in about 2 weeks (around 2022) for routine prenatal.    Claire Patel PA-C

## 2022-07-13 ENCOUNTER — ROUTINE PRENATAL (OUTPATIENT)
Dept: OBGYN | Age: 37
End: 2022-07-13

## 2022-07-13 VITALS — DIASTOLIC BLOOD PRESSURE: 74 MMHG | BODY MASS INDEX: 30.26 KG/M2 | SYSTOLIC BLOOD PRESSURE: 110 MMHG | WEIGHT: 199 LBS

## 2022-07-13 DIAGNOSIS — Z3A.35 35 WEEKS GESTATION OF PREGNANCY: ICD-10-CM

## 2022-07-13 DIAGNOSIS — O09.93 SUPERVISION OF HIGH RISK PREGNANCY IN THIRD TRIMESTER: Primary | ICD-10-CM

## 2022-07-13 DIAGNOSIS — O09.523 AMA (ADVANCED MATERNAL AGE) MULTIGRAVIDA 35+, THIRD TRIMESTER: ICD-10-CM

## 2022-07-13 PROCEDURE — 0502F SUBSEQUENT PRENATAL CARE: CPT | Performed by: OBSTETRICS & GYNECOLOGY

## 2022-07-13 NOTE — PROGRESS NOTES
New problems that she had viral I wanted to BMP today  Return OB Office Visit    CC:   Chief Complaint   Patient presents with    Routine Prenatal Visit     no c/o       HPI:  Patient seen and examined. No concerns/complaints. Denies VB, LOF, ctx. +Fm. Denies headaches, vision changes, RUQ pain, increased LE edema. Denies chest pain, shortness of breath, fever, chills, nausea, vomiting. Review of Systems: The following ROS was otherwise negative, except as noted in the HPI: constitutional, HEENT, respiratory, cardiovascular, gastrointestinal, genitourinary, skin, musculoskeletal, neurological, psych    Objective:  /74   Wt 199 lb (90.3 kg)   LMP 2021 (Approximate)   BMI 30.26 kg/m²     Gravid, non tender, no flank pain    FHR: Positive by doppler    Assessment/Plan:   Curlie Meckel is a 40 y.o.  at 35w6d who presents for routine OB visit     Diagnosis Orders   1. Supervision of high risk pregnancy in third trimester     2. AMA (advanced maternal age) multigravida 33+, third trimester     3. 35 weeks gestation of pregnancy       No orders of the defined types were placed in this encounter. Return in about 1 week (around 2022) for follow up appointment.       Deedee Caban MD

## 2022-07-18 ENCOUNTER — TELEPHONE (OUTPATIENT)
Dept: OBGYN | Age: 37
End: 2022-07-18

## 2022-07-21 ENCOUNTER — ROUTINE PRENATAL (OUTPATIENT)
Dept: OBGYN | Age: 37
End: 2022-07-21

## 2022-07-21 VITALS — WEIGHT: 199 LBS | BODY MASS INDEX: 30.26 KG/M2 | SYSTOLIC BLOOD PRESSURE: 103 MMHG | DIASTOLIC BLOOD PRESSURE: 72 MMHG

## 2022-07-21 DIAGNOSIS — O09.523 ELDERLY MULTIGRAVIDA IN THIRD TRIMESTER: ICD-10-CM

## 2022-07-21 DIAGNOSIS — O09.93 SUPERVISION OF HIGH RISK PREGNANCY IN THIRD TRIMESTER: ICD-10-CM

## 2022-07-21 DIAGNOSIS — O26.843 SIZE OF FETUS INCONSISTENT WITH DATES IN THIRD TRIMESTER: ICD-10-CM

## 2022-07-21 DIAGNOSIS — Z3A.37 37 WEEKS GESTATION OF PREGNANCY: Primary | ICD-10-CM

## 2022-07-21 PROCEDURE — 0502F SUBSEQUENT PRENATAL CARE: CPT | Performed by: OBSTETRICS & GYNECOLOGY

## 2022-07-21 NOTE — PROGRESS NOTES
Return OB Office Visit    CC:   Chief Complaint   Patient presents with    Routine Prenatal Visit     C/o decreased FM . GBS today . HPI:  Patient seen and examined. No concerns/complaints. Denies VB, LOF, ctx. +Fm. Denies headaches, vision changes, RUQ pain, increased LE edema. Denies chest pain, shortness of breath, fever, chills, nausea, vomiting. Review of Systems: The following ROS was otherwise negative, except as noted in the HPI: constitutional, HEENT, respiratory, cardiovascular, gastrointestinal, genitourinary, skin, musculoskeletal, neurological, psych    Objective:  /72   Wt 199 lb (90.3 kg)   LMP 2021 (Approximate)   BMI 30.26 kg/m²     Gravid, non tender, no flank pain    FHR: +by doppler    Assessment/Plan:   Ankur Cardoso is a 40 y.o.  at 37w0d who presents for routine OB visit     Diagnosis Orders   1. 37 weeks gestation of pregnancy  Culture, Strep B Screen, Vaginal/Rectal      2. Supervision of high risk pregnancy in third trimester  Culture, Strep B Screen, Vaginal/Rectal      3. Size of fetus inconsistent with dates in third trimester  US PREG UTERUS FOLLOW UP TRANSABD PER FETUS      4. Elderly multigravida in third trimester          Discussed nst, declines. Will do fkcs. Labor precuation  Return in about 1 week (around 2022).       Tong Bear MD

## 2022-07-24 LAB — GROUP B STREP CULTURE: NORMAL

## 2022-07-28 ENCOUNTER — ROUTINE PRENATAL (OUTPATIENT)
Dept: OBGYN | Age: 37
End: 2022-07-28

## 2022-07-28 VITALS — WEIGHT: 201 LBS | SYSTOLIC BLOOD PRESSURE: 120 MMHG | BODY MASS INDEX: 30.56 KG/M2 | DIASTOLIC BLOOD PRESSURE: 75 MMHG

## 2022-07-28 DIAGNOSIS — O09.93 SUPERVISION OF HIGH RISK PREGNANCY IN THIRD TRIMESTER: Primary | ICD-10-CM

## 2022-07-28 DIAGNOSIS — O09.523 ELDERLY MULTIGRAVIDA IN THIRD TRIMESTER: ICD-10-CM

## 2022-07-28 DIAGNOSIS — Z3A.38 38 WEEKS GESTATION OF PREGNANCY: ICD-10-CM

## 2022-07-28 PROCEDURE — 0502F SUBSEQUENT PRENATAL CARE: CPT | Performed by: OBSTETRICS & GYNECOLOGY

## 2022-07-28 NOTE — PROGRESS NOTES
Return OB Office Visit    CC: No chief complaint on file. HPI:  Patient seen and examined. No concerns/complaints. Denies VB, LOF, ctx. +Fm. Denies headaches, vision changes, RUQ pain, increased LE edema. Denies chest pain, shortness of breath, fever, chills, nausea, vomiting. Review of Systems: The following ROS was otherwise negative, except as noted in the HPI: constitutional, HEENT, respiratory, cardiovascular, gastrointestinal, genitourinary, skin, musculoskeletal, neurological, psych    Objective:  /75   Wt 201 lb (91.2 kg)   LMP 2021 (Approximate)   BMI 30.56 kg/m²     Gravid, non tender, no flank pain    FHR: +by doppler    Assessment/Plan:   Ankur Cardoso is a 40 y.o.  at 38w0d who presents for routine OB visit     Diagnosis Orders   1. Supervision of high risk pregnancy in third trimester        2. Elderly multigravida in third trimester        3. 38 weeks gestation of pregnancy          Fkcs, labor precautions    Return in about 1 week (around 2022).       Tong Bear MD

## 2022-08-04 ENCOUNTER — ROUTINE PRENATAL (OUTPATIENT)
Dept: OBGYN | Age: 37
End: 2022-08-04
Payer: COMMERCIAL

## 2022-08-04 VITALS — SYSTOLIC BLOOD PRESSURE: 112 MMHG | BODY MASS INDEX: 30.71 KG/M2 | DIASTOLIC BLOOD PRESSURE: 78 MMHG | WEIGHT: 202 LBS

## 2022-08-04 DIAGNOSIS — O09.523 ELDERLY MULTIGRAVIDA IN THIRD TRIMESTER: ICD-10-CM

## 2022-08-04 DIAGNOSIS — O09.93 SUPERVISION OF HIGH RISK PREGNANCY IN THIRD TRIMESTER: Primary | ICD-10-CM

## 2022-08-04 DIAGNOSIS — Z3A.39 39 WEEKS GESTATION OF PREGNANCY: ICD-10-CM

## 2022-08-04 PROCEDURE — 59426 ANTEPARTUM CARE ONLY: CPT | Performed by: OBSTETRICS & GYNECOLOGY

## 2022-08-04 NOTE — PROGRESS NOTES
Return OB Office Visit    CC:   Chief Complaint   Patient presents with    Routine Prenatal Visit     No c/o. ed       HPI:  Patient seen and examined. No concerns/complaints. Denies VB, LOF, ctx. +Fm. Denies headaches, vision changes, RUQ pain, increased LE edema. Denies chest pain, shortness of breath, fever, chills, nausea, vomiting. Review of Systems: The following ROS was otherwise negative, except as noted in the HPI: constitutional, HEENT, respiratory, cardiovascular, gastrointestinal, genitourinary, skin, musculoskeletal, neurological, psych    Objective:  /78   Wt 202 lb (91.6 kg)   LMP 2021 (Approximate)   BMI 30.71 kg/m²     Gravid, non tender, no flank pain    FHR: +by doppler    Assessment/Plan:   Sujatha Senior is a 40 y.o.  at 39w0d who presents for routine OB visit     Diagnosis Orders   1. Supervision of high risk pregnancy in third trimester        2. Elderly multigravida in third trimester        3. 39 weeks gestation of pregnancy          Fkcs, labor precautions, induction Monday    Return in about 6 weeks (around 9/15/2022).       Marvin Kennedy MD

## 2022-08-05 ENCOUNTER — HOSPITAL ENCOUNTER (INPATIENT)
Age: 37
LOS: 3 days | Discharge: HOME | End: 2022-08-08
Attending: OBSTETRICS & GYNECOLOGY | Admitting: OBSTETRICS & GYNECOLOGY
Payer: COMMERCIAL

## 2022-08-05 ENCOUNTER — ANESTHESIA EVENT (OUTPATIENT)
Dept: LABOR AND DELIVERY | Age: 37
End: 2022-08-05
Payer: COMMERCIAL

## 2022-08-05 ENCOUNTER — ANESTHESIA (OUTPATIENT)
Dept: LABOR AND DELIVERY | Age: 37
End: 2022-08-05
Payer: COMMERCIAL

## 2022-08-05 LAB
ABO/RH: NORMAL
AMPHETAMINES: NEGATIVE
ANTIBODY SCREEN: NEGATIVE
BARBITURATE SCREEN URINE: NEGATIVE
BASOPHILS ABSOLUTE: 0.1 K/CU MM
BASOPHILS RELATIVE PERCENT: 0.4 % (ref 0–1)
BENZODIAZEPINE SCREEN, URINE: NEGATIVE
CANNABINOID SCREEN URINE: NEGATIVE
COCAINE METABOLITE: NEGATIVE
DIFFERENTIAL TYPE: ABNORMAL
EOSINOPHILS ABSOLUTE: 0.2 K/CU MM
EOSINOPHILS RELATIVE PERCENT: 1.7 % (ref 0–3)
HCT VFR BLD CALC: 33.9 % (ref 37–47)
HEMOGLOBIN: 10.8 GM/DL (ref 12.5–16)
IMMATURE NEUTROPHIL %: 2 % (ref 0–0.43)
LYMPHOCYTES ABSOLUTE: 1.7 K/CU MM
LYMPHOCYTES RELATIVE PERCENT: 12.2 % (ref 24–44)
MCH RBC QN AUTO: 29.5 PG (ref 27–31)
MCHC RBC AUTO-ENTMCNC: 31.9 % (ref 32–36)
MCV RBC AUTO: 92.6 FL (ref 78–100)
MONOCYTES ABSOLUTE: 1.1 K/CU MM
MONOCYTES RELATIVE PERCENT: 7.8 % (ref 0–4)
NUCLEATED RBC %: 0 %
OPIATES, URINE: NEGATIVE
OXYCODONE: NEGATIVE
PDW BLD-RTO: 14.5 % (ref 11.7–14.9)
PHENCYCLIDINE, URINE: NEGATIVE
PLATELET # BLD: 215 K/CU MM (ref 140–440)
PMV BLD AUTO: 12 FL (ref 7.5–11.1)
RBC # BLD: 3.66 M/CU MM (ref 4.2–5.4)
SEGMENTED NEUTROPHILS ABSOLUTE COUNT: 10.4 K/CU MM
SEGMENTED NEUTROPHILS RELATIVE PERCENT: 75.9 % (ref 36–66)
TOTAL IMMATURE NEUTOROPHIL: 0.28 K/CU MM
TOTAL NUCLEATED RBC: 0 K/CU MM
WBC # BLD: 13.7 K/CU MM (ref 4–10.5)

## 2022-08-05 PROCEDURE — 1220000000 HC SEMI PRIVATE OB R&B

## 2022-08-05 PROCEDURE — 6360000002 HC RX W HCPCS: Performed by: ADVANCED PRACTICE MIDWIFE

## 2022-08-05 PROCEDURE — 86850 RBC ANTIBODY SCREEN: CPT

## 2022-08-05 PROCEDURE — 51702 INSERT TEMP BLADDER CATH: CPT

## 2022-08-05 PROCEDURE — 80307 DRUG TEST PRSMV CHEM ANLYZR: CPT

## 2022-08-05 PROCEDURE — 86901 BLOOD TYPING SEROLOGIC RH(D): CPT

## 2022-08-05 PROCEDURE — 86900 BLOOD TYPING SEROLOGIC ABO: CPT

## 2022-08-05 PROCEDURE — 85025 COMPLETE CBC W/AUTO DIFF WBC: CPT

## 2022-08-05 PROCEDURE — 6360000002 HC RX W HCPCS: Performed by: NURSE ANESTHETIST, CERTIFIED REGISTERED

## 2022-08-05 PROCEDURE — 2580000003 HC RX 258: Performed by: ADVANCED PRACTICE MIDWIFE

## 2022-08-05 PROCEDURE — 3700000025 EPIDURAL BLOCK: Performed by: NURSE ANESTHETIST, CERTIFIED REGISTERED

## 2022-08-05 RX ORDER — FENTANYL CITRATE 50 UG/ML
100 INJECTION, SOLUTION INTRAMUSCULAR; INTRAVENOUS
Status: DISCONTINUED | OUTPATIENT
Start: 2022-08-05 | End: 2022-08-06 | Stop reason: ALTCHOICE

## 2022-08-05 RX ORDER — SODIUM CHLORIDE 0.9 % (FLUSH) 0.9 %
5-40 SYRINGE (ML) INJECTION PRN
Status: DISCONTINUED | OUTPATIENT
Start: 2022-08-05 | End: 2022-08-06 | Stop reason: ALTCHOICE

## 2022-08-05 RX ORDER — TRANEXAMIC ACID 10 MG/ML
1000 INJECTION, SOLUTION INTRAVENOUS
Status: ACTIVE | OUTPATIENT
Start: 2022-08-05 | End: 2022-08-05

## 2022-08-05 RX ORDER — SODIUM CHLORIDE 0.9 % (FLUSH) 0.9 %
5-40 SYRINGE (ML) INJECTION EVERY 12 HOURS SCHEDULED
Status: DISCONTINUED | OUTPATIENT
Start: 2022-08-05 | End: 2022-08-06 | Stop reason: ALTCHOICE

## 2022-08-05 RX ORDER — ROPIVACAINE HYDROCHLORIDE 2 MG/ML
INJECTION, SOLUTION EPIDURAL; INFILTRATION; PERINEURAL PRN
Status: DISCONTINUED | OUTPATIENT
Start: 2022-08-05 | End: 2022-08-06 | Stop reason: SDUPTHER

## 2022-08-05 RX ORDER — ONDANSETRON 2 MG/ML
4 INJECTION INTRAMUSCULAR; INTRAVENOUS EVERY 6 HOURS PRN
Status: DISCONTINUED | OUTPATIENT
Start: 2022-08-05 | End: 2022-08-06 | Stop reason: ALTCHOICE

## 2022-08-05 RX ORDER — DOCUSATE SODIUM 100 MG/1
100 CAPSULE, LIQUID FILLED ORAL 2 TIMES DAILY
Status: DISCONTINUED | OUTPATIENT
Start: 2022-08-05 | End: 2022-08-06 | Stop reason: ALTCHOICE

## 2022-08-05 RX ORDER — SODIUM CHLORIDE, SODIUM LACTATE, POTASSIUM CHLORIDE, AND CALCIUM CHLORIDE .6; .31; .03; .02 G/100ML; G/100ML; G/100ML; G/100ML
500 INJECTION, SOLUTION INTRAVENOUS PRN
Status: DISCONTINUED | OUTPATIENT
Start: 2022-08-05 | End: 2022-08-06 | Stop reason: ALTCHOICE

## 2022-08-05 RX ORDER — SODIUM CHLORIDE, SODIUM LACTATE, POTASSIUM CHLORIDE, AND CALCIUM CHLORIDE .6; .31; .03; .02 G/100ML; G/100ML; G/100ML; G/100ML
1000 INJECTION, SOLUTION INTRAVENOUS PRN
Status: DISCONTINUED | OUTPATIENT
Start: 2022-08-05 | End: 2022-08-06 | Stop reason: ALTCHOICE

## 2022-08-05 RX ORDER — SODIUM CHLORIDE 9 MG/ML
25 INJECTION, SOLUTION INTRAVENOUS PRN
Status: DISCONTINUED | OUTPATIENT
Start: 2022-08-05 | End: 2022-08-06 | Stop reason: ALTCHOICE

## 2022-08-05 RX ORDER — CARBOPROST TROMETHAMINE 250 UG/ML
250 INJECTION, SOLUTION INTRAMUSCULAR PRN
Status: DISCONTINUED | OUTPATIENT
Start: 2022-08-05 | End: 2022-08-06 | Stop reason: ALTCHOICE

## 2022-08-05 RX ORDER — METHYLERGONOVINE MALEATE 0.2 MG/ML
200 INJECTION INTRAVENOUS PRN
Status: DISCONTINUED | OUTPATIENT
Start: 2022-08-05 | End: 2022-08-06 | Stop reason: ALTCHOICE

## 2022-08-05 RX ORDER — SODIUM CHLORIDE, SODIUM LACTATE, POTASSIUM CHLORIDE, CALCIUM CHLORIDE 600; 310; 30; 20 MG/100ML; MG/100ML; MG/100ML; MG/100ML
INJECTION, SOLUTION INTRAVENOUS CONTINUOUS
Status: DISCONTINUED | OUTPATIENT
Start: 2022-08-05 | End: 2022-08-06 | Stop reason: ALTCHOICE

## 2022-08-05 RX ORDER — MISOPROSTOL 200 UG/1
800 TABLET ORAL PRN
Status: DISCONTINUED | OUTPATIENT
Start: 2022-08-05 | End: 2022-08-06 | Stop reason: ALTCHOICE

## 2022-08-05 RX ORDER — ROPIVACAINE HYDROCHLORIDE 2 MG/ML
13 INJECTION, SOLUTION EPIDURAL; INFILTRATION; PERINEURAL CONTINUOUS
Status: DISCONTINUED | OUTPATIENT
Start: 2022-08-05 | End: 2022-08-06 | Stop reason: ALTCHOICE

## 2022-08-05 RX ORDER — LIDOCAINE HYDROCHLORIDE 10 MG/ML
30 INJECTION, SOLUTION EPIDURAL; INFILTRATION; INTRACAUDAL; PERINEURAL PRN
Status: DISCONTINUED | OUTPATIENT
Start: 2022-08-05 | End: 2022-08-06 | Stop reason: ALTCHOICE

## 2022-08-05 RX ADMIN — ROPIVACAINE HYDROCHLORIDE 13 ML/HR: 2 INJECTION, SOLUTION EPIDURAL; INFILTRATION at 22:24

## 2022-08-05 RX ADMIN — Medication 1 MILLI-UNITS/MIN: at 20:11

## 2022-08-05 RX ADMIN — SODIUM CHLORIDE, POTASSIUM CHLORIDE, SODIUM LACTATE AND CALCIUM CHLORIDE: 600; 310; 30; 20 INJECTION, SOLUTION INTRAVENOUS at 15:00

## 2022-08-05 RX ADMIN — ROPIVACAINE HYDROCHLORIDE 9 ML: 2 INJECTION, SOLUTION EPIDURAL; INFILTRATION at 22:23

## 2022-08-05 RX ADMIN — SODIUM CHLORIDE, POTASSIUM CHLORIDE, SODIUM LACTATE AND CALCIUM CHLORIDE: 600; 310; 30; 20 INJECTION, SOLUTION INTRAVENOUS at 21:35

## 2022-08-05 RX ADMIN — SODIUM CHLORIDE, POTASSIUM CHLORIDE, SODIUM LACTATE AND CALCIUM CHLORIDE: 600; 310; 30; 20 INJECTION, SOLUTION INTRAVENOUS at 22:35

## 2022-08-05 ASSESSMENT — PAIN DESCRIPTION - LOCATION
LOCATION: PELVIS
LOCATION: PELVIS

## 2022-08-05 ASSESSMENT — PAIN DESCRIPTION - DIRECTION
RADIATING_TOWARDS: UPPER ABDOMEN
RADIATING_TOWARDS: UPPER ABDOMEN

## 2022-08-05 ASSESSMENT — PAIN DESCRIPTION - FREQUENCY
FREQUENCY: INTERMITTENT
FREQUENCY: INTERMITTENT

## 2022-08-05 ASSESSMENT — PAIN DESCRIPTION - ONSET
ONSET: ON-GOING
ONSET: ON-GOING

## 2022-08-05 ASSESSMENT — PAIN DESCRIPTION - ORIENTATION
ORIENTATION: MID;LOWER
ORIENTATION: MID;LOWER

## 2022-08-05 ASSESSMENT — PAIN DESCRIPTION - PAIN TYPE
TYPE: ACUTE PAIN
TYPE: ACUTE PAIN

## 2022-08-05 ASSESSMENT — PAIN - FUNCTIONAL ASSESSMENT
PAIN_FUNCTIONAL_ASSESSMENT: ACTIVITIES ARE NOT PREVENTED
PAIN_FUNCTIONAL_ASSESSMENT: ACTIVITIES ARE NOT PREVENTED

## 2022-08-05 ASSESSMENT — PAIN SCALES - GENERAL
PAINLEVEL_OUTOF10: 5
PAINLEVEL_OUTOF10: 0
PAINLEVEL_OUTOF10: 7
PAINLEVEL_OUTOF10: 8

## 2022-08-05 ASSESSMENT — PAIN DESCRIPTION - DESCRIPTORS
DESCRIPTORS: CRAMPING
DESCRIPTORS: CRAMPING

## 2022-08-05 NOTE — FLOWSHEET NOTE
Pt states was seen yesterday in the office, \"stripped her membranes\" states was 2.5-3 cm in office, was having some light pink spotting after visit, pt states started having contractions this am, about 1100 felt a gush of fluid running down her leg, clear, moderate amount, no smell, has felt baby moving today. Amnisure completed POSITIVE, cervix check 2-3/80/-1.

## 2022-08-05 NOTE — FLOWSHEET NOTE
Pt updated on POC, pt moved to L/D 1, pt requesting to move and walk around before starting pitocin. [address] notified and received order for intermittent monitoring.

## 2022-08-05 NOTE — FLOWSHEET NOTE
Pt requesting to walk around, pt FHM & TOCO removed (as per intermittent monitoring order) pt walking around unit. Denies any needs at this time.

## 2022-08-05 NOTE — FLOWSHEET NOTE
Pt back to room from walking in the hallway for at least 20 mins of FHM [employer] TOCO monitoring. Vitals updated, call light within reach.

## 2022-08-06 PROCEDURE — 0KQM0ZZ REPAIR PERINEUM MUSCLE, OPEN APPROACH: ICD-10-PCS | Performed by: OBSTETRICS & GYNECOLOGY

## 2022-08-06 PROCEDURE — 6370000000 HC RX 637 (ALT 250 FOR IP): Performed by: OBSTETRICS & GYNECOLOGY

## 2022-08-06 PROCEDURE — 7200000001 HC VAGINAL DELIVERY

## 2022-08-06 PROCEDURE — 6360000002 HC RX W HCPCS: Performed by: ADVANCED PRACTICE MIDWIFE

## 2022-08-06 PROCEDURE — 1220000000 HC SEMI PRIVATE OB R&B

## 2022-08-06 PROCEDURE — 2500000003 HC RX 250 WO HCPCS: Performed by: OBSTETRICS & GYNECOLOGY

## 2022-08-06 PROCEDURE — 3E033VJ INTRODUCTION OF OTHER HORMONE INTO PERIPHERAL VEIN, PERCUTANEOUS APPROACH: ICD-10-PCS | Performed by: OBSTETRICS & GYNECOLOGY

## 2022-08-06 RX ORDER — ONDANSETRON 2 MG/ML
4 INJECTION INTRAMUSCULAR; INTRAVENOUS EVERY 6 HOURS PRN
Status: DISCONTINUED | OUTPATIENT
Start: 2022-08-06 | End: 2022-08-08 | Stop reason: HOSPADM

## 2022-08-06 RX ORDER — SODIUM CHLORIDE 0.9 % (FLUSH) 0.9 %
5-40 SYRINGE (ML) INJECTION PRN
Status: DISCONTINUED | OUTPATIENT
Start: 2022-08-06 | End: 2022-08-08 | Stop reason: HOSPADM

## 2022-08-06 RX ORDER — SODIUM CHLORIDE 9 MG/ML
INJECTION, SOLUTION INTRAVENOUS PRN
Status: DISCONTINUED | OUTPATIENT
Start: 2022-08-06 | End: 2022-08-08 | Stop reason: HOSPADM

## 2022-08-06 RX ORDER — ONDANSETRON 4 MG/1
8 TABLET, ORALLY DISINTEGRATING ORAL EVERY 8 HOURS PRN
Status: DISCONTINUED | OUTPATIENT
Start: 2022-08-06 | End: 2022-08-08 | Stop reason: HOSPADM

## 2022-08-06 RX ORDER — OXYCODONE HYDROCHLORIDE 5 MG/1
5 TABLET ORAL EVERY 4 HOURS PRN
Status: DISCONTINUED | OUTPATIENT
Start: 2022-08-06 | End: 2022-08-08 | Stop reason: HOSPADM

## 2022-08-06 RX ORDER — DOCUSATE SODIUM 100 MG/1
100 CAPSULE, LIQUID FILLED ORAL 2 TIMES DAILY
Status: DISCONTINUED | OUTPATIENT
Start: 2022-08-06 | End: 2022-08-08 | Stop reason: HOSPADM

## 2022-08-06 RX ORDER — IBUPROFEN 800 MG/1
800 TABLET ORAL EVERY 8 HOURS
Status: DISCONTINUED | OUTPATIENT
Start: 2022-08-06 | End: 2022-08-08 | Stop reason: HOSPADM

## 2022-08-06 RX ORDER — MISOPROSTOL 200 UG/1
800 TABLET ORAL PRN
Status: DISCONTINUED | OUTPATIENT
Start: 2022-08-06 | End: 2022-08-08 | Stop reason: HOSPADM

## 2022-08-06 RX ORDER — FAMOTIDINE 10 MG/ML
20 INJECTION, SOLUTION INTRAVENOUS 2 TIMES DAILY PRN
Status: DISCONTINUED | OUTPATIENT
Start: 2022-08-06 | End: 2022-08-08 | Stop reason: HOSPADM

## 2022-08-06 RX ORDER — FAMOTIDINE 20 MG/1
20 TABLET, FILM COATED ORAL EVERY EVENING
Status: DISCONTINUED | OUTPATIENT
Start: 2022-08-06 | End: 2022-08-08 | Stop reason: HOSPADM

## 2022-08-06 RX ORDER — SODIUM CHLORIDE, SODIUM LACTATE, POTASSIUM CHLORIDE, CALCIUM CHLORIDE 600; 310; 30; 20 MG/100ML; MG/100ML; MG/100ML; MG/100ML
INJECTION, SOLUTION INTRAVENOUS CONTINUOUS
Status: DISCONTINUED | OUTPATIENT
Start: 2022-08-06 | End: 2022-08-08 | Stop reason: HOSPADM

## 2022-08-06 RX ORDER — LANOLIN 100 %
OINTMENT (GRAM) TOPICAL PRN
Status: DISCONTINUED | OUTPATIENT
Start: 2022-08-06 | End: 2022-08-08 | Stop reason: HOSPADM

## 2022-08-06 RX ORDER — SODIUM CHLORIDE 0.9 % (FLUSH) 0.9 %
5-40 SYRINGE (ML) INJECTION EVERY 12 HOURS SCHEDULED
Status: DISCONTINUED | OUTPATIENT
Start: 2022-08-06 | End: 2022-08-08 | Stop reason: HOSPADM

## 2022-08-06 RX ORDER — ACETAMINOPHEN 500 MG
1000 TABLET ORAL EVERY 8 HOURS PRN
Status: DISCONTINUED | OUTPATIENT
Start: 2022-08-06 | End: 2022-08-08 | Stop reason: HOSPADM

## 2022-08-06 RX ORDER — METHYLERGONOVINE MALEATE 0.2 MG/ML
200 INJECTION INTRAVENOUS PRN
Status: DISCONTINUED | OUTPATIENT
Start: 2022-08-06 | End: 2022-08-08 | Stop reason: HOSPADM

## 2022-08-06 RX ORDER — IBUPROFEN 800 MG/1
800 TABLET ORAL EVERY 8 HOURS PRN
Status: DISCONTINUED | OUTPATIENT
Start: 2022-08-06 | End: 2022-08-06

## 2022-08-06 RX ORDER — OXYCODONE HYDROCHLORIDE 5 MG/1
10 TABLET ORAL EVERY 4 HOURS PRN
Status: DISCONTINUED | OUTPATIENT
Start: 2022-08-06 | End: 2022-08-08 | Stop reason: HOSPADM

## 2022-08-06 RX ADMIN — IBUPROFEN 800 MG: 800 TABLET, FILM COATED ORAL at 03:16

## 2022-08-06 RX ADMIN — DOCUSATE SODIUM 100 MG: 100 CAPSULE, LIQUID FILLED ORAL at 19:35

## 2022-08-06 RX ADMIN — IBUPROFEN 800 MG: 800 TABLET, FILM COATED ORAL at 11:16

## 2022-08-06 RX ADMIN — Medication 87.3 MILLI-UNITS/MIN: at 01:35

## 2022-08-06 RX ADMIN — DOCUSATE SODIUM 100 MG: 100 CAPSULE, LIQUID FILLED ORAL at 09:32

## 2022-08-06 RX ADMIN — FAMOTIDINE 20 MG: 10 INJECTION, SOLUTION INTRAVENOUS at 00:24

## 2022-08-06 RX ADMIN — IBUPROFEN 800 MG: 800 TABLET, FILM COATED ORAL at 19:35

## 2022-08-06 RX ADMIN — FAMOTIDINE 20 MG: 20 TABLET ORAL at 17:06

## 2022-08-06 ASSESSMENT — PAIN DESCRIPTION - ORIENTATION
ORIENTATION: LOWER
ORIENTATION: LOWER

## 2022-08-06 ASSESSMENT — PAIN SCALES - GENERAL
PAINLEVEL_OUTOF10: 0
PAINLEVEL_OUTOF10: 0
PAINLEVEL_OUTOF10: 1
PAINLEVEL_OUTOF10: 4

## 2022-08-06 ASSESSMENT — PAIN DESCRIPTION - ONSET: ONSET: GRADUAL

## 2022-08-06 ASSESSMENT — PAIN DESCRIPTION - FREQUENCY: FREQUENCY: INTERMITTENT

## 2022-08-06 ASSESSMENT — PAIN DESCRIPTION - LOCATION
LOCATION: ABDOMEN;PERINEUM
LOCATION: ABDOMEN

## 2022-08-06 ASSESSMENT — PAIN DESCRIPTION - DESCRIPTORS
DESCRIPTORS: CRAMPING
DESCRIPTORS: CRAMPING

## 2022-08-06 ASSESSMENT — PAIN DESCRIPTION - PAIN TYPE: TYPE: ACUTE PAIN

## 2022-08-06 NOTE — FLOWSHEET NOTE
Mother transferred to MB-14 via wheelchair by RN. Baby transferred to room as well via bassinet. Oriented to room and call light system. Mother denies any needs @ this time. Baby pink, alert, w/o s/s of distress in bassinet.

## 2022-08-06 NOTE — PLAN OF CARE
Problem: Pain  Goal: Verbalizes/displays adequate comfort level or baseline comfort level  Outcome: Completed     Problem: Alteration in the Breast  Description: i.e. Inverted or flat nipples, breast augmentation, breast reduction, etc.  Goal: Optimize infant feeding at the breast  Description: INTERVENTIONS:  1. Breast and nipple assessment  2. Assess prior breast feeding history  3. Hand expression of breast milk  4. Mechanical pumping  5. Nipple Shield  6. Supplemental formula feeding (LIP order)  7. Supplemental feeding system/device  8. For cracked, bleeding and or sore nipples reassess latch, treat damaged nipple  Outcome: Progressing     Problem: Inadequate Latch, Suck, or Swallow  Goal: Demonstrate ability to latch and sustain latch, audible swallowing and satiety  Description: INTERVENTIONS:  1. Assess oral anatomy, notify LIP for abnormal findings  2. Hand expression  3. Maximize feeding opportunity (skin to skin, behavioral state)  4. Positioning techniques  5. Discourage use of pacifier-artificial nipple  6.   Educate mother on feeding cues  Outcome: Progressing

## 2022-08-06 NOTE — L&D DELIVERY SUMMARY NOTE
Department of Obstetrics and Gynecology  Spontaneous Vaginal Delivery Note         Pre-operative Diagnosis:  Term pregnancy, Spontaneous labor, Single fetus, and Uncomplicated pregnancy    Post-operative Diagnosis:  Same + live male   Procedure:  Spontaneous vaginal delivery    Surgeon:  Ana Santana MD    This patient has no babies on file. Anesthesia:  epidural anesthesia    Estimated blood loss:  150    Specimen:  Placenta not sent to pathology     Cord blood sent Yes    Complications:  none    Condition:  infant stable to general nursery    Details of Procedure: The patient is a 40 y.o. female at 44w2d   OB History          2    Para   1    Term   1            AB        Living   1         SAB        IAB        Ectopic        Molar        Multiple        Live Births   1             who was admitted for active phase labor. She received the following interventions: IV Pitocin induction She was known to be GBS negative and did not receive antibiotic prophylaxis. The patient progressed well,did receive an epidural, became complete and started to push. After pushing for 1 hour the fetal head was at the perineum, nose and mouth suctioned with bulb suction and the rest of the infant delivered atraumatically, placed on mother abdomen. Cord was clamped and cut and infant handed off to the waiting nurse for evaluation. The delivery of the placenta was spontaneous. The perineum and vagina were explored and a second degree laceration was repaired in standard fashion. Infant's name is Moises Case.

## 2022-08-06 NOTE — L&D DELIVERY SUMMARY NOTE
Department of Obstetrics and Gynecology   Obstetrics History and Physical        CHIEF COMPLAINT:   Chief Complaint   Patient presents with    Contractions         HISTORY OF PRESENT ILLNESS:      The patient is a 40 y.o. female at 36w3d. OB History          2    Para   1    Term   1            AB        Living   1         SAB        IAB        Ectopic        Molar        Multiple        Live Births   1            Patient presents with a chief complaint as above and is being admitted for active phase labor    Estimated Due Date: Estimated Date of Delivery: 22    PRENATAL CARE:    Complicated by: AMA    PAST OB HISTORY  OB History          2    Para   1    Term   1            AB        Living   1         SAB        IAB        Ectopic        Molar        Multiple        Live Births   1                Past Medical History:        Diagnosis Date    Anxiety     Depression     Pregnant     Seasonal allergic rhinitis 2017     Past Surgical History:        Procedure Laterality Date    WISDOM TOOTH EXTRACTION       Allergies:  Patient has no known allergies.   Social History:    Social History     Socioeconomic History    Marital status:      Spouse name: Not on file    Number of children: Not on file    Years of education: Not on file    Highest education level: Not on file   Occupational History    Not on file   Tobacco Use    Smoking status: Never    Smokeless tobacco: Never   Vaping Use    Vaping Use: Never used   Substance and Sexual Activity    Alcohol use: Not Currently     Comment: occasional - couple a week    Drug use: No    Sexual activity: Yes     Partners: Male   Other Topics Concern    Not on file   Social History Narrative    Not on file     Social Determinants of Health     Financial Resource Strain: Low Risk     Difficulty of Paying Living Expenses: Not hard at all   Food Insecurity: No Food Insecurity    Worried About [address] in the Last Year: Never true    Ran Out of Food in the Last Year: Never true   Transportation Needs: No Transportation Needs    Lack of Transportation (Medical): No    Lack of Transportation (Non-Medical): No   Physical Activity: Not on file   Stress: Not on file   Social Connections: Not on file   Intimate Partner Violence: Not on file   Housing Stability: Unknown    Unable to Pay for Housing in the Last Year: No    Number of Places Lived in the Last Year: Not on file    Unstable Housing in the Last Year: No     Family History:       Problem Relation Age of Onset    Cancer Maternal Aunt     Heart Disease Paternal Grandfather     Heart Disease Father      Medications Prior to Admission:  Medications Prior to Admission: Prenatal-FeFum-FA-DHA w/o A (PRENATAL + DHA) 27-1 & 250 MG THPK, Take 1 tablet by mouth daily  acetaminophen (TYLENOL) 500 MG tablet, Take 500 mg by mouth every 6 hours as needed for Pain     REVIEW OF SYSTEMS:    CONSTITUTIONAL:  negative  RESPIRATORY:  negative  CARDIOVASCULAR:  negative  GASTROINTESTINAL:  negative  ALLERGIC/IMMUNOLOGIC:  negative  NEUROLOGICAL:  negative  BEHAVIOR/PSYCH:  negative    PHYSICAL EXAM:  Blood pressure 124/84, pulse 70, temperature 97.8 °F (36.6 °C), temperature source Oral, resp. rate 16, height 5' 8\" (1.727 m), weight 202 lb (91.6 kg), last menstrual period 11/04/2021, SpO2 99 %, not currently breastfeeding. General appearance:  awake, alert, cooperative, no apparent distress, and appears stated age  Neurologic:  Awake, alert, oriented to name, place and time.     Lungs:  No increased work of breathing, good air exchange  Abdomen:  Soft, non tender, gravid, consistent with her gestational age,   Fetal heart rate:    Baseline Heart Rate:  130        Accelerations:  present       Long Term Variability:  moderate       Decelerations:  absent       Pelvis:  Adequate pelvis  Cervix: 3 cm 80 soft -2      Contraction frequency:  3 minutes    Membranes:  Ruptured clear fluid    ASSESSMENT AND PLAN:    Labor: Admit, anticipate normal delivery, routine labor orders  Fetus: Reassuring  GBS:negative  Other: IV hydration and antiemetics, pitocin

## 2022-08-06 NOTE — ANESTHESIA PROCEDURE NOTES
Epidural Block    Patient location during procedure: OB  Start time: 8/5/2022 10:05 PM  End time: 8/5/2022 10:24 PM  Reason for block: labor epidural  Staffing  Performed: resident/CRNA   Resident/CRNA: EDUARDA Sarmiento CRNA  Epidural  Patient position: sitting  Prep: ChloraPrep  Patient monitoring: cardiac monitor, continuous pulse ox, capnometry and frequent blood pressure checks  Approach: midline  Location: L3-4  Injection technique: BEATRIZ saline  Provider prep: mask and sterile gloves  Needle  Needle type: Tuohy   Needle gauge: 17 G  Needle length: 3.5 in  Needle insertion depth: 5 cm  Catheter type: end hole  Catheter size: 19 G  Catheter at skin depth: 10 cm  Test dose: negativeCatheter Secured: tape  Assessment  Sensory level: T8  Hemodynamics: stable  Attempts: 1  Outcomes: uncomplicated and patient tolerated procedure well  Preanesthetic Checklist  Completed: patient identified, IV checked, site marked, risks and benefits discussed, surgical/procedural consents, equipment checked, pre-op evaluation, timeout performed, anesthesia consent given, oxygen available, monitors applied/VS acknowledged, fire risk safety assessment completed and verbalized and blood product R/B/A discussed and consented

## 2022-08-06 NOTE — ANESTHESIA PRE PROCEDURE
Department of Anesthesiology  Preprocedure Note       Name:  Carmen Solo   Age:  40 y.o.  :  1985                                          MRN:  [id number]         Date:  2022      Surgeon: * No surgeons listed *    Procedure: * No procedures listed *    Medications prior to admission:   Prior to Admission medications    Medication Sig Start Date End Date Taking?  Authorizing Provider   Prenatal-FeFum-FA-DHA w/o A (PRENATAL + DHA) 27-1 & 250 MG THPK Take 1 tablet by mouth daily 21   Connor Norman MD   acetaminophen (TYLENOL) 500 MG tablet Take 500 mg by mouth every 6 hours as needed for Pain     Historical Provider, MD       Current medications:    Current Facility-Administered Medications   Medication Dose Route Frequency Provider Last Rate Last Admin    lactated ringers infusion   IntraVENous Continuous Ullin Vernonia, APRN -  mL/hr at 225 New Bag at 22    lactated ringers bolus  500 mL IntraVENous PRN Ullin Vernonia, APRN - CNM        Or    lactated ringers bolus  1,000 mL IntraVENous PRN Ullin Vernonia, APRN - CNM        sodium chloride flush 0.9 % injection 5-40 mL  5-40 mL IntraVENous 2 times per day Ullin Vernonia, APRN - CNM        sodium chloride flush 0.9 % injection 5-40 mL  5-40 mL IntraVENous PRN Ullin Vernonia, APRN - CNM        0.9 % sodium chloride infusion  25 mL IntraVENous PRN Ullin Vernonia, APRN - CNM        methylergonovine (METHERGINE) injection 200 mcg  200 mcg IntraMUSCular PRN Ullin Vernonia, APRN - CNM        carboprost (HEMABATE) injection 250 mcg  250 mcg IntraMUSCular PRN Ullin Vernonia, APRN - CNM        miSOPROStol (CYTOTEC) tablet 800 mcg  800 mcg Rectal PRN Ullin Vernonia, APRN - CNM        tranexamic acid-NaCl IVPB premix 1,000 mg  1,000 mg IntraVENous Once PRN Ullin Vernonia, APRN - CNM        oxytocin (PITOCIN) 30 units in 500 mL infusion  87.3 aileen-units/min IntraVENous Continuous PRN Ullin Vernonia, APRN - CNM        And    oxytocin (PITOCIN) 10 unit bolus from the bag  10 Units IntraVENous PRN Pat Wadswroth APRN - CNM        lidocaine PF 1 % injection 30 mL  30 mL Other PRN Pat Wadsworth APRN - CNTAYO        fentaNYL (SUBLIMAZE) injection 100 mcg  100 mcg IntraVENous Q1H PRN Pat Wadsworth, APRN - CNM        [facility]dansetron [facility]) injection 4 mg  4 mg IntraVENous Q6H PRN Pat Wadsworth, APRN - CNTAYO        docusate sodium (COLACE) capsule 100 mg  100 mg Oral BID Pat Wadsworth APRN - CNTAYO        oxytocin (PITOCIN) 30 units in 500 mL infusion  1-20 aileen-units/min IntraVENous Continuous EDUARDA De Jesus - CNM 4 mL/hr at 08/05/22 2116 4 aileen-units/min at 08/05/22 2116       Allergies:  No Known Allergies    Problem List:    Patient Active Problem List   Diagnosis Code    Seasonal allergic rhinitis J30.2    Pharyngitis J02.9    Acute recurrent pansinusitis J01.41    Elderly multigravida in third trimester O09.523    History of postpartum depression Z87.59, Z86.59    Supervision of normal pregnancy Z34.90    Labor and delivery indication for care or intervention O75.9       Past Medical History:        Diagnosis Date    Anxiety     Depression     Pregnant     Seasonal allergic rhinitis 4/26/2017       Past Surgical History:        Procedure Laterality Date    WISDOM TOOTH EXTRACTION  1999       Social History:    Social History     Tobacco Use    Smoking status: Never    Smokeless tobacco: Never   Substance Use Topics    Alcohol use: Not Currently     Comment: occasional - couple a week                                Counseling given: Not Answered      Vital Signs (Current):   Vitals:    08/05/22 1602 08/05/22 1729 08/05/22 1842 08/05/22 2001   BP: 123/72 119/78 118/73 124/84   Pulse: 76 66 62 70   Resp: 16 16 16 16   Temp: 36.8 °C (98.3 °F) 36.4 °C (97.6 °F) 36.6 °C (97.8 °F) 36.6 °C (97.8 °F)   TempSrc: Oral Oral Oral Oral   SpO2: 98% 100% 100% 99%   Weight:       Height: BP Readings from Last 3 Encounters:   08/05/22 124/84   08/04/22 112/78   07/28/22 120/75       NPO Status:                                                                                 BMI:   Wt Readings from Last 3 Encounters:   08/05/22 202 lb (91.6 kg)   08/04/22 202 lb (91.6 kg)   07/28/22 201 lb (91.2 kg)     Body mass index is 30.71 kg/m². CBC:   Lab Results   Component Value Date/Time    WBC 13.7 08/05/2022 03:00 PM    RBC 3.66 08/05/2022 03:00 PM    HGB 10.8 08/05/2022 03:00 PM    HCT 33.9 08/05/2022 03:00 PM    MCV 92.6 08/05/2022 03:00 PM    RDW 14.5 08/05/2022 03:00 PM     08/05/2022 03:00 PM       CMP:   Lab Results   Component Value Date/Time     05/14/2020 02:40 PM    K 3.6 05/14/2020 02:40 PM    CL 97 05/14/2020 02:40 PM    CO2 26 05/14/2020 02:40 PM    BUN 6 05/14/2020 02:40 PM    CREATININE 0.8 05/14/2020 02:40 PM    GFRAA >60 05/14/2020 02:40 PM    LABGLOM >60 05/14/2020 02:40 PM    GLUCOSE 94 05/14/2020 02:40 PM    PROT 7.6 10/12/2021 01:24 PM    CALCIUM 9.2 05/14/2020 02:40 PM    BILITOT 1.4 10/12/2021 01:24 PM    ALKPHOS 79 10/12/2021 01:24 PM    AST 25 10/12/2021 01:24 PM    ALT 18 10/12/2021 01:24 PM       POC Tests: No results for input(s): POCGLU, POCNA, POCK, POCCL, POCBUN, POCHEMO, POCHCT in the last 72 hours.     Coags: No results found for: PROTIME, INR, APTT    HCG (If Applicable):   Lab Results   Component Value Date    PREGTESTUR positive 12/20/2021        ABGs: No results found for: PHART, PO2ART, HXF9BBE, WSG9HRW, BEART, I1FIAHBP     Type & Screen (If Applicable):  No results found for: LABABO, LABRH    Drug/Infectious Status (If Applicable):  No results found for: HIV, HEPCAB    COVID-19 Screening (If Applicable): No results found for: COVID19        Anesthesia Evaluation  Patient summary reviewed no history of anesthetic complications:   Airway: Mallampati: II  TM distance: >3 FB   Neck ROM: full  Mouth opening: > = 3 FB   Dental: normal exam         Pulmonary:Negative Pulmonary ROS and normal exam                               Cardiovascular:             Beta Blocker:  Not on Beta Blocker         Neuro/Psych:   (+) psychiatric history:            GI/Hepatic/Renal: Neg GI/Hepatic/Renal ROS            Endo/Other: Negative Endo/Other ROS                    Abdominal:             Vascular: negative vascular ROS. Other Findings:           Anesthesia Plan      epidural     ASA 2             Anesthetic plan and risks discussed with patient.                         EDUARDA Calvillo - JULISA   8/5/2022

## 2022-08-06 NOTE — ANESTHESIA POSTPROCEDURE EVALUATION
Department of Anesthesiology  Postprocedure Note    Patient: Hugo Sheikh  MRN: [id number]  YOB: 1985  Date of evaluation: 8/6/2022      Procedure Summary     Date: 08/05/22 Room / Location:     Anesthesia Start: 2205 Anesthesia Stop: 08/06/22 0120    Procedure: Labor Analgesia Diagnosis:     Scheduled Providers:  Responsible Provider: EDUARDA Hermosillo CRNA    Anesthesia Type: epidural ASA Status: 2          Anesthesia Type: No value filed.     Faina Phase I: Faina Score: 10    Faina Phase II: Faina Score: 10      Anesthesia Post Evaluation    Patient location during evaluation: floor  Patient participation: complete - patient participated  Level of consciousness: awake  Pain score: 2  Airway patency: patent  Nausea & Vomiting: no nausea and no vomiting  Complications: no  Cardiovascular status: blood pressure returned to baseline  Respiratory status: acceptable and room air  Hydration status: euvolemic

## 2022-08-07 PROCEDURE — 6370000000 HC RX 637 (ALT 250 FOR IP): Performed by: OBSTETRICS & GYNECOLOGY

## 2022-08-07 PROCEDURE — 1220000000 HC SEMI PRIVATE OB R&B

## 2022-08-07 RX ADMIN — DOCUSATE SODIUM 100 MG: 100 CAPSULE, LIQUID FILLED ORAL at 20:12

## 2022-08-07 RX ADMIN — DOCUSATE SODIUM 100 MG: 100 CAPSULE, LIQUID FILLED ORAL at 08:28

## 2022-08-07 RX ADMIN — IBUPROFEN 800 MG: 800 TABLET, FILM COATED ORAL at 05:30

## 2022-08-07 RX ADMIN — ACETAMINOPHEN 1000 MG: 500 TABLET ORAL at 08:28

## 2022-08-07 RX ADMIN — IBUPROFEN 800 MG: 800 TABLET, FILM COATED ORAL at 20:12

## 2022-08-07 RX ADMIN — ACETAMINOPHEN 1000 MG: 500 TABLET ORAL at 16:32

## 2022-08-07 RX ADMIN — IBUPROFEN 800 MG: 800 TABLET, FILM COATED ORAL at 13:25

## 2022-08-07 ASSESSMENT — PAIN DESCRIPTION - ONSET
ONSET: ON-GOING
ONSET: GRADUAL

## 2022-08-07 ASSESSMENT — PAIN SCALES - GENERAL
PAINLEVEL_OUTOF10: 4
PAINLEVEL_OUTOF10: 0
PAINLEVEL_OUTOF10: 4
PAINLEVEL_OUTOF10: 7
PAINLEVEL_OUTOF10: 6
PAINLEVEL_OUTOF10: 8
PAINLEVEL_OUTOF10: 4
PAINLEVEL_OUTOF10: 7

## 2022-08-07 ASSESSMENT — PAIN DESCRIPTION - PAIN TYPE
TYPE: ACUTE PAIN

## 2022-08-07 ASSESSMENT — PAIN DESCRIPTION - ORIENTATION
ORIENTATION: MID;LOWER
ORIENTATION: MID;LOWER
ORIENTATION: LOWER
ORIENTATION: LOWER;MID

## 2022-08-07 ASSESSMENT — PAIN DESCRIPTION - DESCRIPTORS
DESCRIPTORS: CRAMPING

## 2022-08-07 ASSESSMENT — PAIN DESCRIPTION - LOCATION
LOCATION: ABDOMEN
LOCATION: PERINEUM;ABDOMEN

## 2022-08-07 ASSESSMENT — PAIN DESCRIPTION - FREQUENCY
FREQUENCY: INTERMITTENT

## 2022-08-07 ASSESSMENT — PAIN - FUNCTIONAL ASSESSMENT
PAIN_FUNCTIONAL_ASSESSMENT: ACTIVITIES ARE NOT PREVENTED

## 2022-08-07 NOTE — PROGRESS NOTES
Department of Obstetrics and Gynecology  Labor and Delivery   Post Partum Progress Note      SUBJECTIVE:  Doing well with no complaints. Reports bleeding is decreasing and pain is well controlled with medication. Has voided without difficulty. Has not had BM, but + flatus. Eating and drinking well. Denies HA/visual changes/epigastric pain. Breastfeeding is going well. Reports good social support. Denies emotional concerns. OBJECTIVE:      Vitals:  /70   Pulse 59   Temp 97.8 °F (36.6 °C) (Oral)   Resp 18   Ht 5' 8\" (1.727 m)   Wt 202 lb (91.6 kg)   LMP 2021 (Approximate)   SpO2 98%   Breastfeeding Unknown   BMI 30.71 kg/m²   Lab Results   Component Value Date    WBC 13.7 (H) 2022    HGB 10.8 (L) 2022    HCT 33.9 (L) 2022    MCV 92.6 2022     2022       ABDOMEN:  Soft, non-tender. Fundus firm at u-1. BS present x 4 quadrants. LOCHIA: Normal per pt  LUNGS: CTAB  HEART: RRR  EXTREMITIES: No calf tenderness, erythema or swelling bilaterally       ASSESSMENT:      PPD # 1  S/p   Breastfeeding well      PLAN:     Continue routine PP orders. Will continue to monitor.       EDUARDA De Jesus CNM

## 2022-08-07 NOTE — PLAN OF CARE
Problem: Pain  Goal: Verbalizes/displays adequate comfort level or baseline comfort level  Recent Flowsheet Documentation  Taken 8/7/2022 0827 by Jacque Guido RN  Verbalizes/displays adequate comfort level or baseline comfort level:   Encourage patient to monitor pain and request assistance   Assess pain using appropriate pain scale   Administer analgesics based on type and severity of pain and evaluate response   Implement non-pharmacological measures as appropriate and evaluate response   Consider cultural and social influences on pain and pain management  Taken 8/7/2022 0530 by Diogo Nelson RN  Verbalizes/displays adequate comfort level or baseline comfort level: Encourage patient to monitor pain and request assistance     Problem: Alteration in the Breast  Goal: Optimize infant feeding at the breast  Description: INTERVENTIONS:  1. Breast and nipple assessment  2. Assess prior breast feeding history  3. Hand expression of breast milk  4. Mechanical pumping  5. Nipple Shield  6. Supplemental formula feeding (LIP order)  7. Supplemental feeding system/device  8. For cracked, bleeding and or sore nipples reassess latch, treat damaged nipple  Outcome: Progressing     Problem: Inadequate Latch, Suck, or Swallow  Goal: Demonstrate ability to latch and sustain latch, audible swallowing and satiety  Description: INTERVENTIONS:  1. Assess oral anatomy, notify LIP for abnormal findings  2. Hand expression  3. Maximize feeding opportunity (skin to skin, behavioral state)  4. Positioning techniques  5. Discourage use of pacifier-artificial nipple  6.   Educate mother on feeding cues  Outcome: Progressing

## 2022-08-08 VITALS
HEART RATE: 64 BPM | WEIGHT: 202 LBS | TEMPERATURE: 98.4 F | HEIGHT: 68 IN | DIASTOLIC BLOOD PRESSURE: 68 MMHG | BODY MASS INDEX: 30.62 KG/M2 | SYSTOLIC BLOOD PRESSURE: 119 MMHG | RESPIRATION RATE: 16 BRPM | OXYGEN SATURATION: 100 %

## 2022-08-08 PROCEDURE — 6370000000 HC RX 637 (ALT 250 FOR IP): Performed by: OBSTETRICS & GYNECOLOGY

## 2022-08-08 RX ORDER — IBUPROFEN 800 MG/1
800 TABLET ORAL EVERY 8 HOURS
Qty: 40 TABLET | Refills: 1 | Status: SHIPPED | OUTPATIENT
Start: 2022-08-08 | End: 2022-10-17 | Stop reason: ALTCHOICE

## 2022-08-08 RX ORDER — PSEUDOEPHEDRINE HCL 30 MG
100 TABLET ORAL 2 TIMES DAILY
Qty: 30 CAPSULE | Refills: 1 | Status: SHIPPED | OUTPATIENT
Start: 2022-08-08 | End: 2022-10-17 | Stop reason: ALTCHOICE

## 2022-08-08 RX ADMIN — IBUPROFEN 800 MG: 800 TABLET, FILM COATED ORAL at 13:04

## 2022-08-08 RX ADMIN — DOCUSATE SODIUM 100 MG: 100 CAPSULE, LIQUID FILLED ORAL at 08:29

## 2022-08-08 RX ADMIN — ACETAMINOPHEN 1000 MG: 500 TABLET ORAL at 01:08

## 2022-08-08 RX ADMIN — ACETAMINOPHEN 1000 MG: 500 TABLET ORAL at 08:28

## 2022-08-08 RX ADMIN — IBUPROFEN 800 MG: 800 TABLET, FILM COATED ORAL at 05:59

## 2022-08-08 ASSESSMENT — PAIN DESCRIPTION - DESCRIPTORS
DESCRIPTORS: CRAMPING

## 2022-08-08 ASSESSMENT — PAIN DESCRIPTION - LOCATION
LOCATION: ABDOMEN

## 2022-08-08 ASSESSMENT — PAIN SCALES - GENERAL
PAINLEVEL_OUTOF10: 2

## 2022-08-08 ASSESSMENT — PAIN DESCRIPTION - ORIENTATION
ORIENTATION: ANTERIOR
ORIENTATION: ANTERIOR;LOWER
ORIENTATION: MID;LOWER

## 2022-08-08 ASSESSMENT — PAIN DESCRIPTION - FREQUENCY
FREQUENCY: INTERMITTENT

## 2022-08-08 ASSESSMENT — PAIN DESCRIPTION - ONSET
ONSET: GRADUAL
ONSET: ON-GOING

## 2022-08-08 ASSESSMENT — PAIN DESCRIPTION - PAIN TYPE
TYPE: ACUTE PAIN

## 2022-08-08 ASSESSMENT — PAIN - FUNCTIONAL ASSESSMENT
PAIN_FUNCTIONAL_ASSESSMENT: ACTIVITIES ARE NOT PREVENTED

## 2022-08-08 NOTE — DISCHARGE SUMMARY
Obstetrical Discharge Form    Gestational Age:  39w2d    Antepartum complications: none    Date of Delivery:   2022      Type of Delivery:   vaginal, spontaneous    Delivered By:   2022             Baby:       Information for the patient's :  Steve Navarro [[id number]]   Birthweight: 8 lb 9.7 oz (3.904 kg)   Anesthesia:    Epidural    Intrapartum complications: None    Feeding method:   breast    Postpartum complications: anemia    Discharge Date:   2022    Condition of discharge:  good    Plan:   Follow up    in 6 week(s)

## 2022-08-08 NOTE — PLAN OF CARE
Problem: Alteration in the Breast  Goal: Optimize infant feeding at the breast  Description: INTERVENTIONS:  1. Breast and nipple assessment  2. Assess prior breast feeding history  3. Hand expression of breast milk  4. Mechanical pumping  5. Nipple Shield  6. Supplemental formula feeding (LIP order)  7. Supplemental feeding system/device  8. For cracked, bleeding and or sore nipples reassess latch, treat damaged nipple  2022 by Yoshi Loza RN  Outcome: Progressing  2022 1156 by Maru Degroot RN  Outcome: Progressing     Problem: Inadequate Latch, Suck, or Swallow  Goal: Demonstrate ability to latch and sustain latch, audible swallowing and satiety  Description: INTERVENTIONS:  1. Assess oral anatomy, notify LIP for abnormal findings  2. Hand expression  3. Maximize feeding opportunity (skin to skin, behavioral state)  4. Positioning techniques  5. Discourage use of pacifier-artificial nipple  6.   Educate mother on feeding cues  2022 by Yoshi Loza RN  Outcome: Progressing  20226 by Maru Degroot RN  Outcome: Progressing     Problem: Postpartum  Goal: Experiences normal postpartum course  Description:  Postpartum OB-Pregnancy care plan goal which identifies if the mother is experiencing a normal postpartum course  Outcome: Progressing  Goal: Appropriate maternal -  bonding  Description:  Postpartum OB-Pregnancy care plan goal which identifies if the mother and  are bonding appropriately  Outcome: Progressing  Goal: Establishment of infant feeding pattern  Description:  Postpartum OB-Pregnancy care plan goal which identifies if the mother is establishing a feeding pattern with their   Outcome: Progressing  Goal: Incisions, wounds, or drain sites healing without S/S of infection  Outcome: Progressing     Problem: Pain  Goal: Verbalizes/displays adequate comfort level or baseline comfort level  Outcome: Progressing  Flowsheets  Taken 2022 by Yoshi Loza RN  Verbalizes/displays adequate comfort level or baseline comfort level: Encourage patient to monitor pain and request assistance  Taken 8/7/2022 0827 by Maru Degroot RN  Verbalizes/displays adequate comfort level or baseline comfort level:   Encourage patient to monitor pain and request assistance   Assess pain using appropriate pain scale   Administer analgesics based on type and severity of pain and evaluate response   Implement non-pharmacological measures as appropriate and evaluate response   Consider cultural and social influences on pain and pain management     Problem: Infection - Adult  Goal: Absence of infection at discharge  Outcome: Progressing  Goal: Absence of infection during hospitalization  Outcome: Progressing  Goal: Absence of fever/infection during anticipated neutropenic period  Outcome: Progressing     Problem: Safety - Adult  Goal: Free from fall injury  Outcome: Progressing     Problem: Discharge Planning  Goal: Discharge to home or other facility with appropriate resources  Outcome: Progressing

## 2022-08-08 NOTE — PROGRESS NOTES
Department of Obstetrics and Gynecology  Labor and Delivery   Post Partum Progress Note      SUBJECTIVE:  Doing well with no complaints. Reports bleeding is decreasing and pain is well controlled with medication. Has voided without difficulty. Has not had BM, but + flatus. Eating and drinking well. Denies HA/visual changes/epigastric pain. Breastfeeding is going well. Reports good social support. Denies emotional concerns. OBJECTIVE:      Vitals:  /73   Pulse 56   Temp 97.7 °F (36.5 °C) (Axillary)   Resp 17   Ht 5' 8\" (1.727 m)   Wt 202 lb (91.6 kg)   LMP 2021 (Approximate)   SpO2 98%   Breastfeeding Unknown   BMI 30.71 kg/m²   Lab Results   Component Value Date    WBC 13.7 (H) 2022    HGB 10.8 (L) 2022    HCT 33.9 (L) 2022    MCV 92.6 2022     2022       ABDOMEN:  Soft, non-tender. Fundus firm at u-1. BS present x 4 quadrants. LOCHIA: Normal per pt  LUNGS: CTAB  HEART: RRR  EXTREMITIES: No calf tenderness, erythema or swelling bilaterally       ASSESSMENT:      PPD # 2  S/p   Breastfeeding well      PLAN:     Will plan for discharge today. Discharge teaching completed including counseling on warning signs (heavy vaginal bleeding, s/s of preeclampsia, fever >100.4, ACHES, s/s of PPD). Rx for colace and ibuprofen. Pt to schedule f/u pp visit at 6 weeks in the office.        EDUARDA Haq CNM

## 2022-08-08 NOTE — FLOWSHEET NOTE
Baby nursing well in side lying position with deep grasp obtained. Baby Mother's side rails up x 2. Delanna Claude (Lactation Consultant) here to assist pt. Hand shells and lanolin cream given.

## 2022-08-08 NOTE — DISCHARGE INSTRUCTIONS
Discharge Instructions    Thank you for letting us care for you and your family. The following are  discharge instructions for yourself and your baby. If you have any questions once you have arrived home please feel free to contact the [facility] at [phone]. MOM INSTRUCTIONS    DIET    Eat a well balanced diet focusing on foods high in fiber and protein. Drink plenty of fluids (  8 to 10 glasses a day) especially water. To avoid constipation you may take a mild stool softener as recommended by your doctor or midwife. ACTIVITY    Gradually increase your activity. Resume your exercise regimen only after advised by you doctor or midwife. Avoid lifting anything heavier than your baby for 6 weeks or until otherwise advised by your doctor or midwife. Avoid driving for 2 weeks or if taking narcotics. Climb stairs one at a time. Use caution when carrying your baby up and down the stairs. NO SEXUAL ACTIVITY and nothing in your vagina( tampons or douching) for 4 to 6 weeks or until advised by your doctor or midwife. Be prepared to discuss family planning at your follow-up OB visit. You may feel tired or have a lack of energy. Nap when the baby naps to catch up on your sleep. You may continue to take prenatal vitamin to replenish nutrients post delivery as directed by your doctor or midwife. EMOTIONS    You may feel sad, teary, overwhelmed and torres. Contact your OB provider if symptoms worsen or last more than 2 weeks. Contact your OB provider if you have thoughts of harming yourself or your baby. If your baby will not stop crying and you are feeling overwhelmed, place your baby in a crib on their back and contact another adult for help. NEVER SHAKE A BABY. BLEEDING    Your vaginal bleeding will decrease in amount over the next 2 to 6 weeks. You will notice that as your activity increases your flow may increase.  This is your body's way of telling you to take it easy and rest more.  If you saturate more than one maxi pad in an hour or you pass a clot larger than a lemon and resting does not help the flow slow down , call your OB doctor or midwife. If your bleeding has a foul odor call you doctor or midwife. BREAST CARE    For breastfeeding moms:  Refer to your breastfeeding booklet provided by the hospital if you have any questions. If you become engorged,feeding may be more difficult or painful for 1 to 2 days. You may find it helpful to express some milk before feeding so that the infant can latch on more easily. Continue to take your prenatal vitamins as directed by your doctor or midwife while you are breastfeeding. For any questions or concerns, contact our Lactation Consultant at [phone]. For non-breastfeeding moms:  You may apply ice packs to your breasts over your bra for twenty minutes at a time for comfort. Avoid stimulation to your breasts. When showering allow the water to strike your back not your breasts. Do not express your milk. Wear a good fitting bra. INCISIONAL CARE    Clean your incision in the shower with mild soap. After your shower pat the incision dry and keep the area open to the air. If used, steri-strips should be removed by 2 weeks. If used,staples should be removed by the OB's office in 1 week. If ordered, an abdominal binder may provide support for your incision. Have some one check your incision ever day for swelling,bleeding,drainage,foul odor,redness and that the edges are approximated. If your incision has any of the above,notify you doctor or midwife. PERINEAL CARE    Use the sowmya-bottle every time after you use the toilet. Cleanse your perineum from front to back. If you had stitches in your perineum,they will dissolve in 4 to 6 weeks. You may use a sitz bath and Tucks pads as directed and as needed for comfort. SWELLING    Try to keep your legs elevated when you are sitting.   When lying down keep your legs elevated. When wearing stockings or socks,make sure they are not too tight. WHEN TO CALL THE DOCTOR    If you have a temperature of 100.6 degrees or higher. If your bleeding has increased and your are soaking a maxi-pad in an hour. If your abdomen is tender to touch. If you are passing blood clots bigger than the size of a lemon. If you are experiencing extreme weakness or dizziness. If you have are having flu-like symptoms such as achy joints and muscles. If there is a foul smell or a green color to your vaginal bleeding. If you have pain that can not be relieved. If you have persistent burning with urination or frequent urination. If you have concerns about your well-being. If you have a warm,firm, red lump in your breast.  If you are unable to sleep,eat, or are having thoughts of harming yourself or the baby. If you have a red,warm, tender area in your calf. If you have swelling, bleeding, drainage,foul odor,redness or warmth in or around your incision or stitches. I verify that I have received the above information,that I have reviewed it and that I have no further questions. [employer] has provided me with the opportunity to view instructional videos pertaining to care of myself and my baby. I will share this information with all caregivers for my child(isatu). I feel confident to care for myself and my baby. Thank you for the opportunity to care for you and your family! After Your Delivery Discharge Instructions      What to do after you leave the hospital:  Recommended diet: regular diet    Recommended activity: No driving for 2 weeks, no sex or tampon use for 6 weeks. No douching. No heavy lifting for 6 weeks. Frequent ambulation with stairs as tolerated. You may return to work in 6 weeks. Follow-up in in 6 weeks.      After your delivery - signs and symptoms to watch for:  Fever - Oral temperature greater than 100.4 degrees Fahrenheit  Foul-smelling vaginal discharge  Headache unrelieved by \"pain meds\"  Difficulty urinating  Breasts reddened, hard, hot to the touch  Nipple discharge which is foul-smelling or contains pus  Increased pain at the site of the episiotomy  Difficulty breathing with or without chest pain  New calf pain especially if only on one side  Sudden, continuing increased vaginal bleeding with or without clots  Unrelieved feelings of:   Inability to cope  Sadness  Anxiety  Lack of interest in baby  Insomnia  Crying     What to do at home:  Resume activity gradually   Don't lift anything heavier than baby and carrier until OK'd by your Physician   No sex until OK'd by your Physician  Eat regular nutritious meals  Take pain medication as prescribed whenever you need them  To avoid/relieve constipation take stool softeners if advised   Increase fiber in your diet

## 2022-08-08 NOTE — FLOWSHEET NOTE
AM assessment complete. Bilateral breath sounds clear on auscultation, Abdomen soft, fundus firm. Using tucks at pad ~ ambulating well, passing gas. Supportive father of baby at bedside. Discussed plan of care for upcoming discharge. Side rails up x 2.

## 2022-08-08 NOTE — FLOWSHEET NOTE
Baby taken to mother for feeding and care. ID verified mother and baby bands correct. Baby latched onto breast with deep grasp obtained. Jaundiced. Alert.

## 2022-08-08 NOTE — LACTATION NOTE
This note was copied from a baby's chart. Visited. Mom is breast feeding in sidelying position. Good latch and steady suckling noted. Mom is loving  and works well with her baby. Teaching reviewed with parents: feeding POC, feeding cues, feeding log, expected output, weight loss/gain and STS. Questions about pumping and storing EBM addressed. I offer to assist and she is encouraged to call PRN.      He Quinones

## 2022-08-09 NOTE — FLOWSHEET NOTE
Mother remains [employer] MB 15 with baby in  [place]. Discharge instructions given and reviewed. Rx's given. Ambulating well at discharge with pain #0. Mother verbalizes understanding to follow-up with Dr Lucas Foote at [facility] in 6 weeks for follow up appt. Mother & father of baby instructed to keep baby band on and given Phone Number to call Nursery 24/7. Father of baby supportive at bedside with mother.

## 2022-08-17 ENCOUNTER — TELEPHONE (OUTPATIENT)
Dept: OBGYN | Age: 37
End: 2022-08-17

## 2022-08-17 RX ORDER — FLUCONAZOLE 150 MG/1
150 TABLET ORAL ONCE
Qty: 14 TABLET | Refills: 0 | Status: SHIPPED | OUTPATIENT
Start: 2022-08-17 | End: 2022-08-17

## 2022-09-12 ENCOUNTER — TELEPHONE (OUTPATIENT)
Dept: OBGYN | Age: 37
End: 2022-09-12

## 2022-09-12 RX ORDER — FLUCONAZOLE 150 MG/1
150 TABLET ORAL ONCE
Qty: 14 TABLET | Refills: 0 | Status: SHIPPED | OUTPATIENT
Start: 2022-09-12 | End: 2022-09-12

## 2022-09-12 NOTE — TELEPHONE ENCOUNTER
Pt requesting if there was anyway to prevent this from happening again. Pt states her and baby have been treated but continue to pass it. What do you recommend?

## 2022-09-13 NOTE — TELEPHONE ENCOUNTER
If both pt and baby are being treated, also be sure to be cleaning all breastfeeding/pumping supplies thoroughly (pump parts, bottles, etc).  May also be a good idea for pt to reach out to Bethanie Marie, lactation consultant at the birthing center to see if she has any other ideas

## 2022-09-19 ENCOUNTER — POSTPARTUM VISIT (OUTPATIENT)
Dept: OBGYN | Age: 37
End: 2022-09-19
Payer: COMMERCIAL

## 2022-09-19 VITALS
SYSTOLIC BLOOD PRESSURE: 118 MMHG | HEIGHT: 68 IN | WEIGHT: 202 LBS | BODY MASS INDEX: 30.62 KG/M2 | DIASTOLIC BLOOD PRESSURE: 80 MMHG

## 2022-09-19 LAB
A/G RATIO: 2 (ref 1.1–2.2)
ALBUMIN SERPL-MCNC: 4.8 G/DL (ref 3.4–5)
ALP BLD-CCNC: 107 U/L (ref 40–129)
ALT SERPL-CCNC: 30 U/L (ref 10–40)
ANION GAP SERPL CALCULATED.3IONS-SCNC: 12 MMOL/L (ref 3–16)
AST SERPL-CCNC: 22 U/L (ref 15–37)
BILIRUB SERPL-MCNC: 0.7 MG/DL (ref 0–1)
BUN BLDV-MCNC: 12 MG/DL (ref 7–20)
CALCIUM SERPL-MCNC: 9.8 MG/DL (ref 8.3–10.6)
CHLORIDE BLD-SCNC: 102 MMOL/L (ref 99–110)
CO2: 27 MMOL/L (ref 21–32)
CREAT SERPL-MCNC: 0.8 MG/DL (ref 0.6–1.1)
GFR AFRICAN AMERICAN: >60
GFR NON-AFRICAN AMERICAN: >60
GLUCOSE BLD-MCNC: 86 MG/DL (ref 70–99)
HCT VFR BLD CALC: 39.9 % (ref 36–48)
HEMOGLOBIN: 12.8 G/DL (ref 12–16)
MCH RBC QN AUTO: 29 PG (ref 26–34)
MCHC RBC AUTO-ENTMCNC: 32.2 G/DL (ref 31–36)
MCV RBC AUTO: 90.1 FL (ref 80–100)
PDW BLD-RTO: 14.8 % (ref 12.4–15.4)
PLATELET # BLD: 243 K/UL (ref 135–450)
PMV BLD AUTO: 9.9 FL (ref 5–10.5)
POTASSIUM SERPL-SCNC: 4 MMOL/L (ref 3.5–5.1)
RBC # BLD: 4.42 M/UL (ref 4–5.2)
SODIUM BLD-SCNC: 141 MMOL/L (ref 136–145)
TOTAL PROTEIN: 7.2 G/DL (ref 6.4–8.2)
TSH REFLEX FT4: 1.79 UIU/ML (ref 0.27–4.2)
WBC # BLD: 8.4 K/UL (ref 4–11)

## 2022-09-19 PROCEDURE — 36415 COLL VENOUS BLD VENIPUNCTURE: CPT | Performed by: OBSTETRICS & GYNECOLOGY

## 2022-09-19 RX ORDER — FLUOXETINE HYDROCHLORIDE 20 MG/1
20 CAPSULE ORAL DAILY
Qty: 30 CAPSULE | Refills: 3 | Status: SHIPPED | OUTPATIENT
Start: 2022-09-19 | End: 2022-10-17 | Stop reason: ALTCHOICE

## 2022-09-19 NOTE — PROGRESS NOTES
22    Jeramy Goode  1985    Chief Complaint   Patient presents with    Postpartum Care     Pt delivered 22 vaginal. Pt is breast feeding, pt has had intercouse since delivery, no menses, last pap  negative, pt wishing to discuss PPD sx. Pt c/o lump in left armpit x1 month. Jeramy Goode is a 40 y.o. female who presents today for evaluation of pp check. Postpartum depression. Past Medical History:   Diagnosis Date    Anxiety     Depression     Pregnant     Scoliosis     Seasonal allergic rhinitis 2017       Past Surgical History:   Procedure Laterality Date    WISDOM TOOTH EXTRACTION         Social History     Tobacco Use    Smoking status: Never    Smokeless tobacco: Never   Vaping Use    Vaping Use: Never used   Substance Use Topics    Alcohol use: Not Currently     Comment: occasional - couple a week    Drug use: No       Family History   Problem Relation Age of Onset    Cancer Maternal Aunt     Heart Disease Paternal Grandfather     Heart Disease Father        Current Outpatient Medications   Medication Sig Dispense Refill    FLUoxetine (PROZAC) 20 MG capsule Take 1 capsule by mouth daily 30 capsule 3    ibuprofen (ADVIL;MOTRIN) 800 MG tablet Take 1 tablet by mouth in the morning and 1 tablet at noon and 1 tablet in the evening. 40 tablet 1    docusate sodium (COLACE, DULCOLAX) 100 MG CAPS Take 100 mg by mouth in the morning and 100 mg before bedtime. 30 capsule 1    Prenatal-FeFum-FA-DHA w/o A (PRENATAL + DHA) 27-1 & 250 MG THPK Take 1 tablet by mouth daily 90 each 3    acetaminophen (TYLENOL) 500 MG tablet Take 500 mg by mouth every 6 hours as needed for Pain        No current facility-administered medications for this visit.        No Known Allergies        Immunization History   Administered Date(s) Administered    MMR 2012       Review of Systems    /80   Ht 5' 8\" (1.727 m)   Wt 202 lb (91.6 kg)   LMP 2021 (Approximate)   Breastfeeding Yes   BMI 30.71 kg/m²     Physical Exam  Exam conducted with a chaperone present. Constitutional:       Appearance: Normal appearance. HENT:      Head: Normocephalic and atraumatic. Nose: Nose normal.   Eyes:      Conjunctiva/sclera: Conjunctivae normal.   Cardiovascular:      Rate and Rhythm: Normal rate. Pulses: Normal pulses. Pulmonary:      Effort: Pulmonary effort is normal.   Chest:   Breasts:     Right: Normal. No axillary adenopathy or supraclavicular adenopathy. Left: Normal. No axillary adenopathy or supraclavicular adenopathy. Comments: Tiny lymph node left axilla  Abdominal:      General: Abdomen is flat. Bowel sounds are normal.      Palpations: Abdomen is soft. Hernia: There is no hernia in the left inguinal area or right inguinal area. Genitourinary:     General: Normal vulva. Exam position: Lithotomy position. Labia:         Right: No rash, tenderness or lesion. Left: No rash, tenderness or lesion. Urethra: No prolapse. Vagina: Normal. No foreign body. No vaginal discharge, erythema, tenderness, bleeding, lesions or prolapsed vaginal walls. Cervix: No cervical motion tenderness, discharge, friability, lesion, erythema or cervical bleeding. Uterus: Normal. Not enlarged, not tender and no uterine prolapse. Adnexa: Right adnexa normal and left adnexa normal.        Right: No mass, tenderness or fullness. Left: No mass, tenderness or fullness. Musculoskeletal:      Cervical back: Normal range of motion and neck supple. Lymphadenopathy:      Upper Body:      Right upper body: No supraclavicular, axillary or pectoral adenopathy. Left upper body: No supraclavicular, axillary or pectoral adenopathy. Skin:     General: Skin is warm. Coloration: Skin is not ashen or cyanotic. Findings: No abrasion, abscess or bruising. Rash is not crusting or macular.    Neurological: Mental Status: She is alert and oriented to person, place, and time. No results found for this visit on 09/19/22. ASSESSMENT AND PLAN   Diagnosis Orders   1. Postpartum exam        2. Postpartum depression  CBC    Comprehensive Metabolic Panel    TSH with Reflex to FT4    FLUoxetine (PROZAC) 20 MG capsule        Off work until next visit    Return in about 4 weeks (around 10/17/2022).     Shiv Oh MD

## 2022-10-03 ENCOUNTER — TELEPHONE (OUTPATIENT)
Dept: OBGYN | Age: 37
End: 2022-10-03

## 2022-10-05 NOTE — TELEPHONE ENCOUNTER
Pt should be scheduled for office visit, believe this is her third call for diflucan regarding these specific symptoms

## 2022-10-17 ENCOUNTER — OFFICE VISIT (OUTPATIENT)
Dept: OBGYN | Age: 37
End: 2022-10-17
Payer: COMMERCIAL

## 2022-10-17 VITALS
HEIGHT: 68 IN | SYSTOLIC BLOOD PRESSURE: 133 MMHG | BODY MASS INDEX: 26.83 KG/M2 | DIASTOLIC BLOOD PRESSURE: 89 MMHG | WEIGHT: 177 LBS

## 2022-10-17 PROBLEM — N64.4 NIPPLE PAIN: Status: ACTIVE | Noted: 2022-10-17

## 2022-10-17 PROCEDURE — 99214 OFFICE O/P EST MOD 30 MIN: CPT | Performed by: NURSE PRACTITIONER

## 2022-10-17 RX ORDER — SERTRALINE HYDROCHLORIDE 100 MG/1
TABLET, FILM COATED ORAL
Qty: 30 TABLET | Refills: 1 | Status: SHIPPED | OUTPATIENT
Start: 2022-10-17

## 2022-10-17 ASSESSMENT — ENCOUNTER SYMPTOMS
VOMITING: 0
GASTROINTESTINAL NEGATIVE: 1
ABDOMINAL PAIN: 0
RESPIRATORY NEGATIVE: 1
SHORTNESS OF BREATH: 0
CONSTIPATION: 0
NAUSEA: 0
DIARRHEA: 0

## 2022-10-17 NOTE — PROGRESS NOTES
10/17/22    Michael Hardy  1985    Chief Complaint   Patient presents with    Medication Check     Pt here for 6 wk f/u on Prozac. States sx have not improved, continues to have anxiety and bad dreams. Delivered 22. Breast Pain     Pt c/o right nipple pain and whit dot on nipple x 4 wks off and on. Michael Hardy is a 40 y.o. female who presents today for evaluation of see above. Past Medical History:   Diagnosis Date    Anxiety     Depression     Nipple pain     Pregnant     Scoliosis     Seasonal allergic rhinitis 2017       Past Surgical History:   Procedure Laterality Date    WISDOM TOOTH EXTRACTION         Social History     Tobacco Use    Smoking status: Never    Smokeless tobacco: Never   Vaping Use    Vaping Use: Never used   Substance Use Topics    Alcohol use: Not Currently     Comment: occasional - couple a week    Drug use: No       Family History   Problem Relation Age of Onset    Cancer Maternal Aunt     Heart Disease Paternal Grandfather     Heart Disease Father        Current Outpatient Medications   Medication Sig Dispense Refill    sertraline (ZOLOFT) 100 MG tablet Take 1/2 tablet (50mg) daily x 2 weeks then go up to full tablet (100mg) daily 30 tablet 1    Prenatal-FeFum-FA-DHA w/o A (PRENATAL + DHA) 27-1 & 250 MG THPK Take 1 tablet by mouth daily 90 each 3    acetaminophen (TYLENOL) 500 MG tablet Take 500 mg by mouth every 6 hours as needed for Pain  (Patient not taking: Reported on 10/17/2022)       No current facility-administered medications for this visit. No Known Allergies        Immunization History   Administered Date(s) Administered    MMR 2012       Review of Systems   Constitutional: Negative. Negative for fatigue. Respiratory: Negative. Negative for shortness of breath. Gastrointestinal: Negative. Negative for abdominal pain, constipation, diarrhea, nausea and vomiting. Genitourinary: Negative.     Neurological: Negative for dizziness. Psychiatric/Behavioral: Negative. /89   Ht 5' 8\" (1.727 m)   Wt 177 lb (80.3 kg)   Breastfeeding Yes   BMI 26.91 kg/m²     Physical Exam  Vitals and nursing note reviewed. Constitutional:       Appearance: Normal appearance. HENT:      Head: Normocephalic. Pulmonary:      Effort: Pulmonary effort is normal.   Chest:   Breasts:     Right: No swelling, bleeding, inverted nipple, mass, nipple discharge, skin change or tenderness. Left: Normal. No swelling, bleeding, inverted nipple, mass, nipple discharge, skin change or tenderness. Comments: Normal breast exam today  Musculoskeletal:         General: Normal range of motion. Cervical back: Normal range of motion. Skin:     General: Skin is warm and dry. Neurological:      Mental Status: She is alert. Psychiatric:         Mood and Affect: Mood normal.       No results found for this visit on 10/17/22. ASSESSMENT AND PLAN   Diagnosis Orders   1. Postpartum depression          No improvement with Prozac, denies SI/HI, has support. Sleeping ok but awakes frequently with infant. Pt desires to bottle feed on occasion but infant doesn't appear to either like the formula or tolerate the type of nipple, pt encouraged to try different nipples and trial pumping then feeding. Will trial switch to Zoloft with strict instructions to call should s/s worsen. Off work until next appointment in 2-3 weeks. No follow-ups on file.     Kassie Grace, APRN - CNP

## 2022-11-03 ENCOUNTER — OFFICE VISIT (OUTPATIENT)
Dept: OBGYN | Age: 37
End: 2022-11-03
Payer: COMMERCIAL

## 2022-11-03 VITALS
DIASTOLIC BLOOD PRESSURE: 75 MMHG | BODY MASS INDEX: 26.98 KG/M2 | HEIGHT: 68 IN | SYSTOLIC BLOOD PRESSURE: 107 MMHG | WEIGHT: 178 LBS

## 2022-11-03 DIAGNOSIS — F41.8 POSTPARTUM ANXIETY: Primary | ICD-10-CM

## 2022-11-03 DIAGNOSIS — G47.00 DIFFICULTY STAYING ASLEEP: ICD-10-CM

## 2022-11-03 PROCEDURE — 99214 OFFICE O/P EST MOD 30 MIN: CPT | Performed by: NURSE PRACTITIONER

## 2022-11-03 ASSESSMENT — ENCOUNTER SYMPTOMS
GASTROINTESTINAL NEGATIVE: 1
CONSTIPATION: 0
ABDOMINAL PAIN: 0
VOMITING: 0
NAUSEA: 0
SHORTNESS OF BREATH: 0
DIARRHEA: 0
RESPIRATORY NEGATIVE: 1

## 2022-11-03 NOTE — PROGRESS NOTES
11/3/22    Natali Diaz  1985    Chief Complaint   Patient presents with    Breast Problem     Pt c/o \"milk bleb,\" white dot still on nipple. Follow-up     Pt is taking Zoloft. Pt states she is still having anxiety and bad nightmares about her baby. Pt delivered 22. Pt states she is to go back to work tomorrow. Natali Diaz is a 40 y.o. female who presents today for evaluation of see above    Past Medical History:   Diagnosis Date    Anxiety     Depression     Nipple pain     Pregnant     Scoliosis     Seasonal allergic rhinitis 2017       Past Surgical History:   Procedure Laterality Date    WISDOM TOOTH EXTRACTION         Social History     Tobacco Use    Smoking status: Never    Smokeless tobacco: Never   Vaping Use    Vaping Use: Never used   Substance Use Topics    Alcohol use: Not Currently     Comment: occasional - couple a week    Drug use: No       Family History   Problem Relation Age of Onset    Cancer Maternal Aunt     Heart Disease Paternal Grandfather     Heart Disease Father        Current Outpatient Medications   Medication Sig Dispense Refill    sertraline (ZOLOFT) 50 MG tablet Take 1 tablet by mouth daily 30 tablet 1    sertraline (ZOLOFT) 100 MG tablet Take 1/2 tablet (50mg) daily x 2 weeks then go up to full tablet (100mg) daily 30 tablet 1    Prenatal-FeFum-FA-DHA w/o A (PRENATAL + DHA) 27-1 & 250 MG THPK Take 1 tablet by mouth daily 90 each 3    acetaminophen (TYLENOL) 500 MG tablet Take 500 mg by mouth every 6 hours as needed for Pain  (Patient not taking: Reported on 10/17/2022)       No current facility-administered medications for this visit. No Known Allergies        Immunization History   Administered Date(s) Administered    MMR 2012       Review of Systems   Constitutional: Negative. Negative for fatigue. Respiratory: Negative. Negative for shortness of breath. Gastrointestinal: Negative.   Negative for abdominal pain, constipation, diarrhea, nausea and vomiting. Genitourinary: Negative. Neurological:  Negative for dizziness. Psychiatric/Behavioral: Negative. /75   Ht 5' 8\" (1.727 m)   Wt 178 lb (80.7 kg)   Breastfeeding Yes   BMI 27.06 kg/m²     Physical Exam  Vitals and nursing note reviewed. Constitutional:       Appearance: Normal appearance. HENT:      Head: Normocephalic. Pulmonary:      Effort: Pulmonary effort is normal.   Musculoskeletal:         General: Normal range of motion. Cervical back: Normal range of motion. Skin:     General: Skin is warm and dry. Neurological:      Mental Status: She is alert. Psychiatric:         Mood and Affect: Mood normal.       No results found for this visit on 11/03/22. ASSESSMENT AND PLAN   Diagnosis Orders   1. Postpartum anxiety        2. Difficulty staying asleep          Anxiety discussed; has support. Will trial increasing Zoloft to 150mg QD. Return in about 3 weeks (around 11/24/2022).     Sandra Gore, EDUARDA - CNP

## 2022-11-11 ENCOUNTER — TELEPHONE (OUTPATIENT)
Dept: FAMILY MEDICINE CLINIC | Age: 37
End: 2022-11-11

## 2022-11-11 NOTE — TELEPHONE ENCOUNTER
Patient would like to talk to Dr. Margarito Benavidez regarding some worsening anxiety that she has been having since   having her baby 2 months ago.  She has already talked to her OB, but feels   more comfortable talking with Dr. Margarito Benavidez

## 2022-11-11 NOTE — TELEPHONE ENCOUNTER
----- Message from Estefania Brown sent at 11/11/2022 11:59 AM EST -----  Subject: Appointment Request    Reason for Call: Established Patient Appointment needed: Routine [facility]    Reason for appointment request? Available appointments did not meet   patient need     Additional Information for Provider? Patient would like to talk to Dr. Jessa Hernandez regarding some worsening anxiety that she has been having since   having her baby 2 months ago.  She has already talked to her OB, but feels   more comfortable talking with Dr. Jessa Hernandez.   ---------------------------------------------------------------------------  --------------  [id number]  [phone]; OK to leave message on voicemail  ---------------------------------------------------------------------------  --------------  SCRIPT ANSWERS  COVID Screen: Javy Tobias

## 2023-09-19 RX ORDER — FLUOXETINE HYDROCHLORIDE 20 MG/1
20 CAPSULE ORAL DAILY
Qty: 30 CAPSULE | Refills: 3 | OUTPATIENT
Start: 2023-09-19

## 2023-11-08 ENCOUNTER — TELEPHONE (OUTPATIENT)
Dept: OBGYN | Age: 38
End: 2023-11-08

## 2023-11-09 ENCOUNTER — OFFICE VISIT (OUTPATIENT)
Dept: OBGYN | Age: 38
End: 2023-11-09
Payer: COMMERCIAL

## 2023-11-09 VITALS
SYSTOLIC BLOOD PRESSURE: 131 MMHG | BODY MASS INDEX: 28.64 KG/M2 | WEIGHT: 189 LBS | DIASTOLIC BLOOD PRESSURE: 85 MMHG | HEIGHT: 68 IN | HEART RATE: 98 BPM

## 2023-11-09 DIAGNOSIS — F34.1 DYSTHYMIA: Primary | ICD-10-CM

## 2023-11-09 DIAGNOSIS — F41.1 GAD (GENERALIZED ANXIETY DISORDER): ICD-10-CM

## 2023-11-09 PROCEDURE — 99213 OFFICE O/P EST LOW 20 MIN: CPT | Performed by: OBSTETRICS & GYNECOLOGY

## 2023-11-09 RX ORDER — FLUOXETINE HYDROCHLORIDE 20 MG/1
20 CAPSULE ORAL DAILY
COMMUNITY
End: 2023-11-09 | Stop reason: SDUPTHER

## 2023-11-09 RX ORDER — FLUOXETINE HYDROCHLORIDE 20 MG/1
20 CAPSULE ORAL DAILY
Qty: 30 CAPSULE | Refills: 2 | Status: SHIPPED | OUTPATIENT
Start: 2023-11-09

## 2023-11-09 SDOH — ECONOMIC STABILITY: FOOD INSECURITY: WITHIN THE PAST 12 MONTHS, THE FOOD YOU BOUGHT JUST DIDN'T LAST AND YOU DIDN'T HAVE MONEY TO GET MORE.: NEVER TRUE

## 2023-11-09 SDOH — ECONOMIC STABILITY: FOOD INSECURITY: WITHIN THE PAST 12 MONTHS, YOU WORRIED THAT YOUR FOOD WOULD RUN OUT BEFORE YOU GOT MONEY TO BUY MORE.: NEVER TRUE

## 2023-11-09 SDOH — ECONOMIC STABILITY: INCOME INSECURITY: HOW HARD IS IT FOR YOU TO PAY FOR THE VERY BASICS LIKE FOOD, HOUSING, MEDICAL CARE, AND HEATING?: NOT VERY HARD

## 2023-11-20 ENCOUNTER — TELEPHONE (OUTPATIENT)
Dept: FAMILY MEDICINE CLINIC | Age: 38
End: 2023-11-20

## 2023-11-20 NOTE — TELEPHONE ENCOUNTER
----- Message from Shine Tomas sent at 11/17/2023  2:50 PM EST -----  Subject: Appointment Request    Reason for Call: Established Patient Appointment needed: Routine [facility]    Reason for appointment request? Available appointments did not meet   patient need     Additional Information for Provider? Pt wants to f/u regarding her anxiety   & discuss recent weight gain. She currently has an apt scheduled for   1/18/24, but would like a call if there is anything available sooner.    ---------------------------------------------------------------------------  --------------  Chloe Burnett QRID  [id number]; OK to leave message on voicemail  ---------------------------------------------------------------------------  --------------  SCRIPT ANSWERS

## 2023-11-20 NOTE — TELEPHONE ENCOUNTER
Pt wants to f/u regarding her anxiety   & discuss recent weight gain. She currently has an apt scheduled for   1/18/24, but would like a call if there is anything available sooner. Called and left a vm for pt to call office over the next several weeks for any open appointments.

## 2023-12-11 ENCOUNTER — TELEPHONE (OUTPATIENT)
Dept: FAMILY MEDICINE CLINIC | Age: 38
End: 2023-12-11

## 2023-12-11 RX ORDER — ACYCLOVIR 800 MG/1
800 TABLET ORAL 2 TIMES DAILY
Qty: 10 TABLET | Refills: 1 | Status: SHIPPED | OUTPATIENT
Start: 2023-12-11 | End: 2023-12-16

## 2023-12-11 NOTE — TELEPHONE ENCOUNTER
Pt has a cold sore. Pt stated she has been using Abreva and its not working. She is asking for something stronger be sent to her pharmacy [facility] in [facility]. I asked pt to send a picture through her "[employer]hart but she stated she doesn't use [employer].  Please advise

## 2024-01-26 ENCOUNTER — OFFICE VISIT (OUTPATIENT)
Dept: FAMILY MEDICINE CLINIC | Age: 39
End: 2024-01-26
Payer: COMMERCIAL

## 2024-01-26 VITALS
DIASTOLIC BLOOD PRESSURE: 78 MMHG | OXYGEN SATURATION: 97 % | BODY MASS INDEX: 28.74 KG/M2 | WEIGHT: 189 LBS | SYSTOLIC BLOOD PRESSURE: 108 MMHG | HEART RATE: 86 BPM

## 2024-01-26 DIAGNOSIS — R63.5 WEIGHT GAIN, ABNORMAL: ICD-10-CM

## 2024-01-26 DIAGNOSIS — R53.83 OTHER FATIGUE: Primary | ICD-10-CM

## 2024-01-26 DIAGNOSIS — M54.2 CERVICALGIA: ICD-10-CM

## 2024-01-26 DIAGNOSIS — G89.29 CHRONIC LEFT SHOULDER PAIN: ICD-10-CM

## 2024-01-26 DIAGNOSIS — M25.512 CHRONIC LEFT SHOULDER PAIN: ICD-10-CM

## 2024-01-26 PROCEDURE — 99214 OFFICE O/P EST MOD 30 MIN: CPT | Performed by: FAMILY MEDICINE

## 2024-01-26 RX ORDER — METHYLPREDNISOLONE 4 MG/1
TABLET ORAL
Qty: 1 KIT | Refills: 0 | Status: SHIPPED | OUTPATIENT
Start: 2024-01-26

## 2024-01-26 ASSESSMENT — PATIENT HEALTH QUESTIONNAIRE - PHQ9
SUM OF ALL RESPONSES TO PHQ9 QUESTIONS 1 & 2: 3
7. TROUBLE CONCENTRATING ON THINGS, SUCH AS READING THE NEWSPAPER OR WATCHING TELEVISION: 1
9. THOUGHTS THAT YOU WOULD BE BETTER OFF DEAD, OR OF HURTING YOURSELF: 0
10. IF YOU CHECKED OFF ANY PROBLEMS, HOW DIFFICULT HAVE THESE PROBLEMS MADE IT FOR YOU TO DO YOUR WORK, TAKE CARE OF THINGS AT HOME, OR GET ALONG WITH OTHER PEOPLE: 1
SUM OF ALL RESPONSES TO PHQ QUESTIONS 1-9: 6
6. FEELING BAD ABOUT YOURSELF - OR THAT YOU ARE A FAILURE OR HAVE LET YOURSELF OR YOUR FAMILY DOWN: 1
SUM OF ALL RESPONSES TO PHQ QUESTIONS 1-9: 6
8. MOVING OR SPEAKING SO SLOWLY THAT OTHER PEOPLE COULD HAVE NOTICED. OR THE OPPOSITE, BEING SO FIGETY OR RESTLESS THAT YOU HAVE BEEN MOVING AROUND A LOT MORE THAN USUAL: 0
5. POOR APPETITE OR OVEREATING: 0
2. FEELING DOWN, DEPRESSED OR HOPELESS: 2
SUM OF ALL RESPONSES TO PHQ QUESTIONS 1-9: 6
SUM OF ALL RESPONSES TO PHQ QUESTIONS 1-9: 6
4. FEELING TIRED OR HAVING LITTLE ENERGY: 1
1. LITTLE INTEREST OR PLEASURE IN DOING THINGS: 1

## 2024-01-26 NOTE — PROGRESS NOTES
Chief Complaint   Patient presents with    Other     Pt would just like general check up     Back Pain     Pt c/o neck and back pain chronically --actually mostly left neck pain that radiates into the left shoulder and left jaw, she has a small child and believes the pain started while breast-feeding but has continued.  Reports she is \"popping ibuprofen like candy\" and now has tons of heartburn    Weight Management     Pt c/o recent weight gain since delivering her child and has been unable to lose, a friend who is a nurse recommended she be evaluated with blood testing    Abdominal Pain     Reports tons of heartburn, likely related to heavy NSAID use       [place] NARRATIVE:    Patient reports she has gained 20 pounds since last feb since postpartum checkup.  She is up 11 pounds from fall 2022 on our scale.    Left shoulder neck and jaw and shoulder pain. Since breastfeeding.  Having massage therapy also chiropractic therapy for this left neck and shoulder pain, reports little relief. No prior trouble with shoulder or neck and no x-ray was performed by chiropractor.  With questioning she does report an incident with sister's large dog yanking her left arm and strain and she did in a separate incident fall in her yard holding her baby but does not recall if she landed on her arm.   She denies neurological symptoms like weakness burning or numbness.  She does have pain radiating into the left jaw however.    Unfortunately I was late coming into her appointment and we decided to do a brief assessment and order some labs for workup and try some simple interventions.    Patient is established unless otherwise noted.  Above chief complaint and [place] obtained by this physician provider.     Review of Systems   Constitutional:  Positive for fatigue (With questioning admits fatigue and low energy state) and unexpected weight change. Negative for activity change, chills and fever.   HENT:  Negative for congestion.

## 2024-01-27 ASSESSMENT — ENCOUNTER SYMPTOMS
ABDOMINAL PAIN: 0
CHEST TIGHTNESS: 0
COUGH: 0
BACK PAIN: 0
SHORTNESS OF BREATH: 0
WHEEZING: 0

## 2024-02-19 ENCOUNTER — HOSPITAL ENCOUNTER (OUTPATIENT)
Dept: GENERAL RADIOLOGY | Age: 39
Discharge: HOME | End: 2024-02-19
Payer: COMMERCIAL

## 2024-02-19 ENCOUNTER — HOSPITAL ENCOUNTER (OUTPATIENT)
Age: 39
Discharge: HOME | End: 2024-02-19
Payer: COMMERCIAL

## 2024-02-19 DIAGNOSIS — M54.2 CERVICALGIA: ICD-10-CM

## 2024-02-19 DIAGNOSIS — M25.512 CHRONIC LEFT SHOULDER PAIN: ICD-10-CM

## 2024-02-19 DIAGNOSIS — G89.29 CHRONIC LEFT SHOULDER PAIN: ICD-10-CM

## 2024-02-19 LAB
ALBUMIN SERPL-MCNC: 4.6 GM/DL (ref 3.4–5)
ALP BLD-CCNC: 78 IU/L (ref 40–128)
ALT SERPL-CCNC: 17 U/L (ref 10–40)
ANION GAP SERPL CALCULATED.3IONS-SCNC: 14 MMOL/L (ref 7–16)
AST SERPL-CCNC: 17 IU/L (ref 15–37)
BILIRUB SERPL-MCNC: 0.5 MG/DL (ref 0–1)
BUN SERPL-MCNC: 11 MG/DL (ref 6–23)
CALCIUM SERPL-MCNC: 8.9 MG/DL (ref 8.3–10.6)
CHLORIDE BLD-SCNC: 99 MMOL/L (ref 99–110)
CO2: 24 MMOL/L (ref 21–32)
CREAT SERPL-MCNC: 0.7 MG/DL (ref 0.6–1.1)
GFR SERPL CREATININE-BSD FRML MDRD: >60 ML/MIN/1.73M2
GLUCOSE SERPL-MCNC: 97 MG/DL (ref 70–99)
HCT VFR BLD CALC: 42.6 % (ref 37–47)
HEMOGLOBIN: 13.1 GM/DL (ref 12.5–16)
MCH RBC QN AUTO: 28.5 PG (ref 27–31)
MCHC RBC AUTO-ENTMCNC: 30.8 % (ref 32–36)
MCV RBC AUTO: 92.6 FL (ref 78–100)
PDW BLD-RTO: 13.5 % (ref 11.7–14.9)
PLATELET # BLD: 292 K/CU MM (ref 140–440)
PMV BLD AUTO: 11.2 FL (ref 7.5–11.1)
POTASSIUM SERPL-SCNC: 4 MMOL/L (ref 3.5–5.1)
RBC # BLD: 4.6 M/CU MM (ref 4.2–5.4)
SODIUM BLD-SCNC: 137 MMOL/L (ref 135–145)
TOTAL PROTEIN: 7.3 GM/DL (ref 6.4–8.2)
TSH SERPL DL<=0.005 MIU/L-ACNC: 2.26 UIU/ML (ref 0.27–4.2)
WBC # BLD: 8.3 K/CU MM (ref 4–10.5)

## 2024-02-19 PROCEDURE — 36415 COLL VENOUS BLD VENIPUNCTURE: CPT

## 2024-02-19 PROCEDURE — 82306 VITAMIN D 25 HYDROXY: CPT

## 2024-02-19 PROCEDURE — 73030 X-RAY EXAM OF SHOULDER: CPT

## 2024-02-19 PROCEDURE — 82746 ASSAY OF FOLIC ACID SERUM: CPT

## 2024-02-19 PROCEDURE — 72040 X-RAY EXAM NECK SPINE 2-3 VW: CPT

## 2024-02-19 PROCEDURE — 80053 COMPREHEN METABOLIC PANEL: CPT

## 2024-02-19 PROCEDURE — 84443 ASSAY THYROID STIM HORMONE: CPT

## 2024-02-19 PROCEDURE — 85027 COMPLETE CBC AUTOMATED: CPT

## 2024-02-19 PROCEDURE — 82607 VITAMIN B-12: CPT

## 2024-02-19 NOTE — RESULT ENCOUNTER NOTE
Recent labs look ok.  Minor abnormalities in blood count but no anemia.  Metabolic panel with normal electrolytes and glucose and liver and kidney function.Thyroid function is normal.  Vitamin levels are still pending and will be reported separately

## 2024-02-20 LAB
25(OH)D3 SERPL-MCNC: 28.63 NG/ML
FOLATE SERPL-MCNC: >20 NG/ML (ref 3.1–17.5)
VITAMIN B-12: 668.8 PG/ML (ref 211–911)

## 2024-02-22 ENCOUNTER — TELEMEDICINE (OUTPATIENT)
Dept: FAMILY MEDICINE CLINIC | Age: 39
End: 2024-02-22
Payer: COMMERCIAL

## 2024-02-22 DIAGNOSIS — R19.7 DIARRHEA, UNSPECIFIED TYPE: ICD-10-CM

## 2024-02-22 DIAGNOSIS — R10.9 STOMACH CRAMPS: ICD-10-CM

## 2024-02-22 DIAGNOSIS — M54.2 CERVICALGIA: Primary | ICD-10-CM

## 2024-02-22 DIAGNOSIS — M47.812 FACET ARTHRITIS OF CERVICAL REGION: ICD-10-CM

## 2024-02-22 PROCEDURE — 99214 OFFICE O/P EST MOD 30 MIN: CPT | Performed by: FAMILY MEDICINE

## 2024-02-22 RX ORDER — METAXALONE 800 MG/1
800 TABLET ORAL 3 TIMES DAILY
Qty: 30 TABLET | Refills: 1 | Status: SHIPPED | OUTPATIENT
Start: 2024-02-22 | End: 2024-03-13

## 2024-02-22 ASSESSMENT — ENCOUNTER SYMPTOMS
ABDOMINAL PAIN: 1
CONSTIPATION: 0
BLOOD IN STOOL: 0
DIARRHEA: 1
ABDOMINAL DISTENTION: 1

## 2024-02-22 NOTE — RESULT ENCOUNTER NOTE
SHE DOES NOT LOOK AT MY CHART MUST CALL  Has virtual this afternoon    Xray shows one level with some disc degeneration and some arthritic changes in the FACET joints on the sides of the spine are seen at multiple levels.  The cervical spine is also straightened likely due to discomfort and muscle spasm.      This could still benefit from medications or exercises, including referral to PT if she is still having a lot of problems.  If neck pain CONTINUES AS A BIG PROBLEM WE COULD HAVE PAIN MANAGEMENT SEE HER THEY HAVE TREATMENTS FOR THAT FACET ARTHRITIS.

## 2024-02-22 NOTE — RESULT ENCOUNTER NOTE
Patient DOES NOT LOOK AT MY WERNER, please call her:    Recent labs look ok.  Minor abnormalities in blood count but no anemia.  Metabolic panel with normal electrolytes and glucose and liver and kidney function.Thyroid function is normal.  Vitamin levels were also good

## 2024-02-22 NOTE — PROGRESS NOTES
Previsit chart review: Patient was seen at the end of January for back and neck pain problems with weight gain and history of abdominal pain probably gastritis due to heavy NSAID use.  She had blood work which showed minor abnormalities in blood count but no anemia, metabolic panel was normal with respect to electrolytes glucose and liver and kidney function.  Thyroid function and vitamin levels were good.  X-ray surprisingly showed some cervical spine arthritis.  These results were sent to patient.      
Allergy, [place]        So we will monitor her fatigue if we can get her neck pain better than she will probably do better.  She was given a trial of muscle relaxant and referred to physical therapy for her neck.  I advised her to give it 2 or 3 weeks in therapy and if not doing better she will let me know, we may consider ordering an MRI and having her see a specialist.    For her diarrhea and stomach cramps with suspicion of food intolerance we will send her to allergist.  After their evaluation if she continues to have bloating and cramping we may consider sending her to gastroenterology, consideration of SIBO or IBS as possible diagnoses.  If she is unable to lose weight and is interested and specialist assessment I can refer her to bariatrics.    Return if symptoms worsen or fail to improve.    Christa Beckford is a 38 y.o. female being evaluated by a Virtual Visit (video visit) encounter to address concerns as mentioned above.  A caregiver was present when appropriate.     PATIENT GAVE EXPRESS VERBAL CONSENT TODAY to receiving care by a VIRTUAL VISIT.    Due to this being a TeleHealth encounter evaluation of the following organ systems was limited: Vitals/Constitutional/EENT/Resp/CV/GI//MS/Neuro/Skin/Heme-Lymph-Imm.  The patient (and/or legal guardian) has also been advised to contact this office for worsening conditions or problems, and seek emergency medical treatment and/or call 911 if deemed necessary.     Services were provided through a video synchronous discussion virtually to substitute for in-person clinic visit.     Total time spent for this encounter:  30    --Odilia Alarcon MD on 2/22/2024 at 3:28 PM    An electronic signature was used to authenticate this note.

## 2024-03-07 ENCOUNTER — HOSPITAL ENCOUNTER (OUTPATIENT)
Dept: PHYSICAL THERAPY | Age: 39
Setting detail: THERAPIES SERIES
Discharge: HOME | End: 2024-03-07

## 2024-03-07 NOTE — PROGRESS NOTES
Physical Therapy  Cancellation/No-show Note  Patient Name:  Christa Beckford  :  1985   Date:  3/7/2024  Cancelled visits to date: 1  No-shows to date: 0    For today's appointment patient:  [x]  Cancelled  []  Rescheduled appointment  []  No-show     Reason given by patient:  []  Patient ill  []  Conflicting appointment  []  No transportation    []  Conflict with work  []  No reason given  []  Other:     Comments:  cancel eval on 3/7/24.     Electronically signed by:  Vincent Mathews, PT, DPT, OCS      3/7/2024 8:18 AM

## 2024-03-25 ENCOUNTER — HOSPITAL ENCOUNTER (OUTPATIENT)
Dept: PHYSICAL THERAPY | Age: 39
Setting detail: THERAPIES SERIES
Discharge: HOME | End: 2024-03-25
Payer: COMMERCIAL

## 2024-03-25 PROCEDURE — 97110 THERAPEUTIC EXERCISES: CPT

## 2024-03-25 PROCEDURE — 97161 PT EVAL LOW COMPLEX 20 MIN: CPT

## 2024-03-25 ASSESSMENT — PAIN SCALES - GENERAL: PAINLEVEL_OUTOF10: 5

## 2024-03-25 NOTE — PLAN OF CARE
Outpatient Physical Therapy           [place]           [x] Phone: [phone]   Fax: [phone]  [place]           [] Phone: [phone]   Fax: [phone]     To:  Odilia Alarcon MD   From: Bea Barnett, SPT and Cindy Samuel, PT, DPT     Patient: Christa Beckford       : 1985  Diagnosis: Diagnosis: Cervicalgia (M54.2), Facet arthritis of cervical region (M47.812)  Treatment Diagnosis: Impaired L UE strength, L neck pain, impaired posture  Date: 3/25/2024    Physical Therapy Certification/Re-Certification Form  Dear Dr. Odilia Alarcon,  The following patient has been evaluated for physical therapy services and for therapy to continue, insurance requires physician review of the treatment plan initially and every 90 days. Please review the attached evaluation and/or summary of the patient's plan of care, and verify that you agree therapy should continue by signing the attached document and sending it back to our office.    Assessment:    Assessment: Pt is a 38-year-old female who presents to therapy with complaints of insidious, chronic L sided jaw, neck, and shoulder pain. Upon assessment, pt demonstrates normal L shoulder mobility, impaired L upper extremity strength, impaired posture, tender to palpation at levator scapulae and upper trapezius bilaterally, pectoralis major and minor tightness, and upper thoracic hypomobility. Pt presents with a presentation that is muscular in nature, does not present with any s/s of c-spine structural impairment. Pt would benefit from skilled physical therapy to address left upper extremity weakness, deficits with mobility, and to progress towards current goals to prevent regression or injury.  Patient agrees with established plan of care and assisted in the development of their short term and long term goals. Patient had no adverse reaction with initial treatment and there are no barriers to learning.  Learning preferences include demonstration, practice,

## 2024-03-25 NOTE — PROGRESS NOTES
Stretch at 90 Degrees Abduction  - 2 x daily - 7 x weekly - 2 sets - 1 reps - 30 hold     Treatment may include any combination of the following: Current Treatment Recommendations: Strengthening, ROM, Functional mobility training, ADL/Self-care training, IADL training, Neuromuscular re-education, Pain management, Patient/Caregiver education & training, Home exercise program, Return to work related activity, Modalities, Positioning, Dry needling, Therapeutic activities  Modalities: Heat/Cold, Mechanical Traction, E-stim - unattended     Frequency / Duration:  Patient to be seen 1 for 6 weeks      Eval Complexity:    Decision Making: Low Complexity    Therapist Signature: Bea Barnett, SPT  and Cindy Samuel PT, DPT  Date: 3/25/2024     I certify that the above Therapy Services are being furnished while the patient is under my care. I agree with the treatment plan and certify that this therapy is necessary.      Physician's Signature:  ___________________________   Date:_______                                                                   Odilia Alarcon MD        Physician Comments: _______________________________________________    Please sign and return to [facility].  Please fax to the location listed below. THANK YOU for this referral!    [facility] PHYSICAL THERAPY  [address]  [facility] [address]  Dept: [phone]  Dept Fax: [phone]  Loc: [phone]       POC NOTE

## 2024-03-25 NOTE — FLOWSHEET NOTE
Outpatient Physical Therapy  [place]           [x] Phone: [phone]   Fax: [phone]  [place]           [] Phone: [phone]   Fax: [phone]        Physical Therapy Daily Treatment Note  Date:  3/25/2024    Patient Name:  Christa Beckford    :  1985  MRN: [id number]  Restrictions/Precautions: No data recorded      Diagnosis:   Diagnosis: Cervicalgia (M54.2), Facet arthritis of cervical region (M47.812)  Date of Injury/Surgery:   Treatment Diagnosis:  Impaired L UE strength, L neck pain, impaired posture  Insurance/Certification information: [facility] - [facility]  Referring Physician:  Odilia Alarcon MD   PCP: Odilia Alarcon MD  Next Doctor Visit:    Plan of care signed (Y/N):  sent 3/25  Outcome Measure: NDI:   Visit# / total visits:   1/10  Pain level: 4-5/10   Goals:     Patient goals: improve left jaw, neck, and shoulder pain and mobility  Short term goals  Time Frame for Short term goals: Refer to [facility]                 Long Term Goals  Time Frame for Long Term Goals: 5 Visits  Pt will improve NDI score to 15/55 or less in order to improve subjective reports of neck pain.  Pt will improve right cervical side bending to 37 deg or more in order to demonstrate cervical motion equivalent to other side.  Pt will improve right cervical rotation to 50 deg or more in order to demonstrate cervical motion equivalent to other side.  Pt will improve L UE strength to 4+/5 in order to lift up her child without diffiulty.         Summary of Evaluation:  Assessment: Pt is a 38-year-old female who presents to therapy with complaints of insidious, chronic L sided jaw, neck, and shoulder pain. Upon assessment, pt demonstrates normal L shoulder mobility, impaired L upper extremity strength, impaired posture, tender to palpation at levator scapulae and upper trapezius bilaterally, pectoralis major and minor tightness, and upper thoracic hypomobility. Pt presents with a presentation that is muscular in nature,

## 2024-04-16 ENCOUNTER — HOSPITAL ENCOUNTER (OUTPATIENT)
Dept: PHYSICAL THERAPY | Age: 39
Discharge: HOME | End: 2024-04-16

## 2024-04-16 NOTE — FLOWSHEET NOTE
Physical Therapy  Cancellation/No-show Note  Patient Name:  Christa Beckford  :  1985   Date:  2024  Cancelled visits to date: 1  No-shows to date: 0    For today's appointment patient:  [x]  Cancelled  []  Rescheduled appointment  []  No-show     Reason given by patient:  []  Patient ill  []  Conflicting appointment  []  No transportation    []  Conflict with work  []  No reason given  [x]  Other:     Comments:  Kid is sick    Electronically signed by:  Cindy Samuel, PT, DPT

## 2024-04-19 ENCOUNTER — TELEPHONE (OUTPATIENT)
Dept: FAMILY MEDICINE CLINIC | Age: 39
End: 2024-04-19

## 2024-04-19 RX ORDER — ACYCLOVIR 800 MG/1
800 TABLET ORAL 2 TIMES DAILY
Qty: 10 TABLET | Refills: 2 | Status: SHIPPED | OUTPATIENT
Start: 2024-04-19 | End: 2024-04-24

## 2024-05-08 NOTE — TELEPHONE ENCOUNTER
Acyclovir refill was sent to her pharmacy at her request, last ordered in December 2023  
Patient has been advised.   
Pt called regarding a cold sore. She stated she gets them all the time and has been treated before. I asked her to send an evisit or send a mychart message with a picture but she is not able to get into her mychart. I asked her if I could help her get into her mychart but she was at work and wasn't able to do this at the moment. Patient is asking for something to be called into her pharmacy [facility] in [place]. Please advise  
32.2

## 2024-11-28 NOTE — FLOWSHEET NOTE
Pt ambulatory to labor and delivery, pt to TR 3, pt to bathroom to change into gown and obtain urine spec, FHM & TOCO placed on pt. No